# Patient Record
Sex: MALE | Race: WHITE | Employment: OTHER | URBAN - NONMETROPOLITAN AREA
[De-identification: names, ages, dates, MRNs, and addresses within clinical notes are randomized per-mention and may not be internally consistent; named-entity substitution may affect disease eponyms.]

---

## 2017-07-27 ENCOUNTER — APPOINTMENT (OUTPATIENT)
Dept: OCCUPATIONAL MEDICINE | Facility: CLINIC | Age: 57
End: 2017-07-27

## 2017-07-27 PROCEDURE — 99000 SPECIMEN HANDLING OFFICE-LAB: CPT | Performed by: FAMILY MEDICINE

## 2019-01-04 ENCOUNTER — TRANSFERRED RECORDS (OUTPATIENT)
Dept: HEALTH INFORMATION MANAGEMENT | Facility: CLINIC | Age: 59
End: 2019-01-04

## 2019-01-18 ENCOUNTER — TRANSFERRED RECORDS (OUTPATIENT)
Dept: HEALTH INFORMATION MANAGEMENT | Facility: CLINIC | Age: 59
End: 2019-01-18

## 2019-02-01 ENCOUNTER — TRANSFERRED RECORDS (OUTPATIENT)
Dept: HEALTH INFORMATION MANAGEMENT | Facility: CLINIC | Age: 59
End: 2019-02-01

## 2019-05-16 ENCOUNTER — TRANSFERRED RECORDS (OUTPATIENT)
Dept: HEALTH INFORMATION MANAGEMENT | Facility: CLINIC | Age: 59
End: 2019-05-16

## 2019-05-23 ENCOUNTER — TRANSFERRED RECORDS (OUTPATIENT)
Dept: HEALTH INFORMATION MANAGEMENT | Facility: CLINIC | Age: 59
End: 2019-05-23

## 2019-08-27 ENCOUNTER — TRANSFERRED RECORDS (OUTPATIENT)
Dept: HEALTH INFORMATION MANAGEMENT | Facility: CLINIC | Age: 59
End: 2019-08-27

## 2019-08-29 ENCOUNTER — TRANSFERRED RECORDS (OUTPATIENT)
Dept: HEALTH INFORMATION MANAGEMENT | Facility: CLINIC | Age: 59
End: 2019-08-29

## 2019-08-29 ENCOUNTER — TELEPHONE (OUTPATIENT)
Dept: UROLOGY | Facility: CLINIC | Age: 59
End: 2019-08-29

## 2019-08-29 ENCOUNTER — MEDICAL CORRESPONDENCE (OUTPATIENT)
Dept: HEALTH INFORMATION MANAGEMENT | Facility: CLINIC | Age: 59
End: 2019-08-29

## 2019-08-29 NOTE — TELEPHONE ENCOUNTER
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  Appointment scheduled with Dr. Russell on 10/3/2019, referral requesting Dr. Licea.  Per protocol Dr. Licea is not listed as a provider to treat this diagnosis.  Routing encounter to clinic to advise on sooner appointment.

## 2019-08-29 NOTE — TELEPHONE ENCOUNTER
Changed appt to Thursday 9/5 at 6pm and tried calling all numbers  NONE of them went thru to machine Grace Powell LPN Staff Nurse

## 2019-08-29 NOTE — TELEPHONE ENCOUNTER
ONCOLOGY INTAKE: Records Information      APPT INFORMATION:  Referring provider:  Dr. 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 yes    Has patient been seen for any external appt for this diagnosis? yes    If yes, where? [facility]    Has patient had any imaging or procedures outside of [place] for this condition? yes      If Yes, where? [facility]    ADDITIONAL INFORMATION:  Records sent to CSS team

## 2019-08-30 ENCOUNTER — TELEPHONE (OUTPATIENT)
Dept: UROLOGY | Facility: CLINIC | Age: 59
End: 2019-08-30

## 2019-08-30 NOTE — TELEPHONE ENCOUNTER
[facility] Call Center    Phone Message    May a detailed message be left on voicemail: yes    Reason for Call: Other: Jose Armando calling to reschedule 09/05 appt. He states he wont be able to make it. Please call him back to reschedule.      Action Taken: Message routed to:  [facility] ([facility]): azam meek

## 2019-08-30 NOTE — TELEPHONE ENCOUNTER
Appointment rescheduled with Dr. Russell for Oct. 3rd. Pt states he needs to give his work notice, and this date works perfect for him.

## 2019-08-30 NOTE — TELEPHONE ENCOUNTER
Action Elham 8/30 10:58AM    Action Taken Called [facility], spoke with Gilda she will fax 32 pages.

## 2019-09-03 NOTE — TELEPHONE ENCOUNTER
Action Elham 9/3 7AM    Action Taken [facility] recs scanned in epic  Image CT Abd Pelvis 5/23/19 available in  PACS

## 2019-09-10 PROBLEM — R31.0 GROSS HEMATURIA: Status: ACTIVE | Noted: 2019-08-27

## 2019-09-10 PROBLEM — E11.9 DIABETES (H): Status: ACTIVE | Noted: 2019-08-27

## 2019-09-10 PROBLEM — N20.0 KIDNEY STONES: Status: ACTIVE | Noted: 2019-08-27

## 2019-09-10 PROBLEM — D07.4: Status: ACTIVE | Noted: 2019-08-27

## 2019-09-12 ENCOUNTER — PRE VISIT (OUTPATIENT)
Dept: UROLOGY | Facility: CLINIC | Age: 59
End: 2019-09-12

## 2019-09-12 NOTE — TELEPHONE ENCOUNTER
Reason for Visit: Consult    Diagnosis: Carcinoma in situ of skin of penis    Orders/Procedures/Records: Records available    Contact Patient: N/A    Rooming Requirements: Teo Montes De Oca  09/12/19  12:37 PM

## 2019-09-19 ENCOUNTER — DOCUMENTATION ONLY (OUTPATIENT)
Dept: CARE COORDINATION | Facility: CLINIC | Age: 59
End: 2019-09-19

## 2019-10-01 ENCOUNTER — HEALTH MAINTENANCE LETTER (OUTPATIENT)
Age: 59
End: 2019-10-01

## 2019-10-03 ENCOUNTER — OFFICE VISIT (OUTPATIENT)
Dept: UROLOGY | Facility: CLINIC | Age: 59
End: 2019-10-03
Payer: COMMERCIAL

## 2019-10-03 ENCOUNTER — PRE VISIT (OUTPATIENT)
Dept: UROLOGY | Facility: CLINIC | Age: 59
End: 2019-10-03

## 2019-10-03 VITALS
HEART RATE: 91 BPM | WEIGHT: 175 LBS | SYSTOLIC BLOOD PRESSURE: 152 MMHG | BODY MASS INDEX: 23.7 KG/M2 | HEIGHT: 72 IN | DIASTOLIC BLOOD PRESSURE: 89 MMHG

## 2019-10-03 DIAGNOSIS — D07.4: Primary | ICD-10-CM

## 2019-10-03 RX ORDER — PIOGLITAZONEHYDROCHLORIDE 45 MG/1
TABLET ORAL
COMMUNITY
Start: 2019-07-18

## 2019-10-03 RX ORDER — DULAGLUTIDE 0.75 MG/.5ML
INJECTION, SOLUTION SUBCUTANEOUS
COMMUNITY
Start: 2019-07-29

## 2019-10-03 RX ORDER — GLIPIZIDE 5 MG/1
TABLET, FILM COATED, EXTENDED RELEASE ORAL
COMMUNITY
Start: 2019-08-19

## 2019-10-03 RX ORDER — SITAGLIPTIN 100 MG/1
TABLET, FILM COATED ORAL
COMMUNITY
Start: 2019-08-23

## 2019-10-03 RX ORDER — SIMVASTATIN 40 MG
TABLET ORAL
COMMUNITY
Start: 2019-04-11

## 2019-10-03 ASSESSMENT — MIFFLIN-ST. JEOR: SCORE: 1656.79

## 2019-10-03 ASSESSMENT — PAIN SCALES - GENERAL: PAINLEVEL: NO PAIN (0)

## 2019-10-03 NOTE — LETTER
"10/3/2019     RE: Jose Armando Brar  [address] [facility] [address]     Dear Colleague,    Thank you for referring your patient, Jose Armando Brar, to the [facility] UROLOGY AND [facility] FOR PROSTATE AND UROLOGIC CANCERS at [facility]. Please see a copy of my visit note below.      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 There is no evidence of cancer currently.      Plan:  He will monitor the area carefully. If he develops any oozing, redness, pain, or bulge, the area should be re-excised. We discussed that Mohs surgery would be the safest, most conservative option but is likely not necessary.     Follow up: He  follow up every 3-4 months in the first year and then 6 months later after the 1 year maxi.     Attestation:  This patient was seen and evaluated by me, with a scribe taking notes.  I have reviewed the note above and agree.  The physical exam and or any procedures were performed by me and the pertinant details are outlined below.       Jake Russell MD  Department of Urology  [facility]    ---------------------------------------------------------------------------------------------------------------------    Chief Complaint:   Carcinoma in situ of skin of penis    HPI:   Jose Armando Brar  is a 59 year old male with history of CIS with positive margins on two biopsies in 2018 and 2019. Chart review of prior urology notes indicate that \"all specimens showed high grade penile squamous cell carcinoma in situ with no invasion. All pieces had at least one area of positive margin.\"     His prior urologist passed away and he saw Dr. Leigh Ann Mccurdy in follow up on " "08/27/2019, who noted history of gross hematuria with right flank pain in March or April. It resolved and then returned intermittently through July. He had a hand written report from someone at [facility] that said \"+kidney stones\" for the CT report. Cystoscopy showed no source or hematuria.     Review of Systems:   Pertinent items are noted in HPI or as below, remainder of complete ROS is negative.      Active Medications:     Current Outpatient Medications:      glipiZIDE (GLUCOTROL XL) 5 MG 24 hr tablet, , Disp: , Rfl:      JANUVIA 100 MG tablet, , Disp: , Rfl:      pioglitazone (ACTOS) 45 MG tablet, , Disp: , Rfl:      simvastatin (ZOCOR) 40 MG tablet, , Disp: , Rfl:      TRULICITY 0.75 MG/0.5ML pen, , Disp: , Rfl:       Allergies:   Nkda [no known drug allergies]      Past Medical History:  Carcinoma in situ of skin of penis   Diabetes   Gross hematuria   Kidney stones     Past Surgical History:  Open left shoulder dislocation 11/30/2007   SCC excision penis 02/01/2019   Bilateral hernia repair   Knee surgery     Family History:   Prostate cancer in father and paternal uncle.     Social History:   Former smoker, 8 pack years.   Occasional alcohol use.      Physical Exam:   Patient is a 59 year old  male   Vitals: Blood pressure (!) 152/89, pulse 91, height 1.829 m (6'), weight 79.4 kg (175 lb).  General Appearance Adult: Alert, no acute distress, oriented  HENT: throat/mouth:normal, good dentition  Lungs: no respiratory distress, or pursed lip breathing  Heart: No obvious jugular venous distension present  Abdomen: soft, nontender, no organomegaly or masses, Body mass index is 23.73 kg/m .  Lymphatics: No cervical or supraclavicular adenopathy  Musculoskeltal: extremities normal, no peripheral edema  Skin: no visible suspicious lesions or rashes  Neuro: Alert, oriented, speech and mentation normal  Psych: affect and mood normal  Gait: Normal  : Mild scarring noted on the shaft of the penis at the " surgical site. No velvety lesions. No bleeding. Non-palpable inguinal lymph nodes bilaterally.     Imaging:   I have personally reviewed the results of the below imaging studies. The results were discussed with the patient.     CT abdomen pelvis 05/23/2019         Laboratory:   I personally reviewed all applicable laboratory data and went over findings with patient  Significant for:    PSA of 0.81 on 01/18/2019 02/01/2019:         11/15/2018       Scribe Disclosure:  I, Lizett Kraus, am serving as a scribe to document services personally performed by Jake Russell MD at this visit, based upon the provider's statements to me. All documentation has been reviewed by the aforementioned provider prior to being entered into the official medical record.     Again, thank you for allowing me to participate in the care of your patient.      Sincerely,    Jake Russell MD

## 2019-10-03 NOTE — PROGRESS NOTES
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 There is no evidence of cancer currently.      Plan:  He will monitor the area carefully. If he develops any oozing, redness, pain, or bulge, the area should be re-excised. We discussed that Mohs surgery would be the safest, most conservative option but is likely not necessary.     Follow up: He  follow up every 3-4 months in the first year and then 6 months later after the 1 year maxi.     Attestation:  This patient was seen and evaluated by me, with a scribe taking notes.  I have reviewed the note above and agree.  The physical exam and or any procedures were performed by me and the pertinant details are outlined below.       Jake Russell MD  Department of Urology  [facility]    ---------------------------------------------------------------------------------------------------------------------    Chief Complaint:   Carcinoma in situ of skin of penis    HPI:   Jose Armando Brar  is a 59 year old male with history of CIS with positive margins on two biopsies in 2018 and 2019. Chart review of prior urology notes indicate that \"all specimens showed high grade penile squamous cell carcinoma in situ with no invasion. All pieces had at least one area of positive margin.\"     His prior urologist passed away and he saw Dr. Leigh Ann Mccurdy in follow up on 2019, who noted history of gross hematuria with right flank pain in March or April. It resolved and then returned intermittently through July. He had a hand written report from someone at [facility] that said \"+kidney stones\" for the CT report. Cystoscopy showed no source or hematuria.     Review of " Systems:   Pertinent items are noted in HPI or as below, remainder of complete ROS is negative.      Active Medications:     Current Outpatient Medications:      glipiZIDE (GLUCOTROL XL) 5 MG 24 hr tablet, , Disp: , Rfl:      JANUVIA 100 MG tablet, , Disp: , Rfl:      pioglitazone (ACTOS) 45 MG tablet, , Disp: , Rfl:      simvastatin (ZOCOR) 40 MG tablet, , Disp: , Rfl:      TRULICITY 0.75 MG/0.5ML pen, , Disp: , Rfl:       Allergies:   Nkda [no known drug allergies]      Past Medical History:  Carcinoma in situ of skin of penis   Diabetes   Gross hematuria   Kidney stones     Past Surgical History:  Open left shoulder dislocation 11/30/2007   SCC excision penis 02/01/2019   Bilateral hernia repair   Knee surgery     Family History:   Prostate cancer in father and paternal uncle.     Social History:   Former smoker, 8 pack years.   Occasional alcohol use.      Physical Exam:   Patient is a 59 year old  male   Vitals: Blood pressure (!) 152/89, pulse 91, height 1.829 m (6'), weight 79.4 kg (175 lb).  General Appearance Adult: Alert, no acute distress, oriented  HENT: throat/mouth:normal, good dentition  Lungs: no respiratory distress, or pursed lip breathing  Heart: No obvious jugular venous distension present  Abdomen: soft, nontender, no organomegaly or masses, Body mass index is 23.73 kg/m .  Lymphatics: No cervical or supraclavicular adenopathy  Musculoskeltal: extremities normal, no peripheral edema  Skin: no visible suspicious lesions or rashes  Neuro: Alert, oriented, speech and mentation normal  Psych: affect and mood normal  Gait: Normal  : Mild scarring noted on the shaft of the penis at the surgical site. No velvety lesions. No bleeding. Non-palpable inguinal lymph nodes bilaterally.     Imaging:   I have personally reviewed the results of the below imaging studies. The results were discussed with the patient.     CT abdomen pelvis 05/23/2019         Laboratory:   I personally reviewed all applicable  laboratory data and went over findings with patient  Significant for:    PSA of 0.81 on 01/18/2019 02/01/2019:         11/15/2018       Scribe Disclosure:  I, Lizett Kraus, am serving as a scribe to document services personally performed by Jake Russell MD at this visit, based upon the provider's statements to me. All documentation has been reviewed by the aforementioned provider prior to being entered into the official medical record.

## 2019-10-03 NOTE — NURSING NOTE
Chief Complaint   Patient presents with     Consult     Carcinoma in situ of skin of penis       Blood pressure (!) 152/89, pulse 91, height 1.829 m (6'), weight 79.4 kg (175 lb). Body mass index is 23.73 kg/m .    Patient Active Problem List   Diagnosis     Carcinoma in situ of skin of penis     Diabetes (H)     Gross hematuria     Kidney stones       Allergies   Allergen Reactions     Nkda [No Known Drug Allergies]      Noted in 11/30/07 ER       Current Outpatient Medications   Medication Sig Dispense Refill     glipiZIDE (GLUCOTROL XL) 5 MG 24 hr tablet        JANUVIA 100 MG tablet        pioglitazone (ACTOS) 45 MG tablet        simvastatin (ZOCOR) 40 MG tablet        TRULICITY 0.75 MG/0.5ML pen          Social History     Tobacco Use     Smoking status: Former Smoker     Packs/day: 0.00     Smokeless tobacco: Never Used   Substance Use Topics     Alcohol use: None     Drug use: None       Joellen Montes De Oca, EMT  10/3/2019  4:18 PM

## 2019-10-17 ENCOUNTER — RECORDS - HEALTHEAST (OUTPATIENT)
Dept: ADMINISTRATIVE | Facility: OTHER | Age: 59
End: 2019-10-17

## 2020-01-16 ASSESSMENT — MIFFLIN-ST. JEOR: SCORE: 1677.65

## 2020-01-22 ENCOUNTER — RECORDS - HEALTHEAST (OUTPATIENT)
Dept: ADMINISTRATIVE | Facility: OTHER | Age: 60
End: 2020-01-22

## 2020-01-23 ENCOUNTER — HOSPITAL ENCOUNTER (OUTPATIENT)
Dept: INTERVENTIONAL RADIOLOGY/VASCULAR | Facility: CLINIC | Age: 60
Discharge: HOME | End: 2020-01-23
Attending: UROLOGY | Admitting: UROLOGY

## 2020-01-23 ENCOUNTER — ANESTHESIA - HEALTHEAST (OUTPATIENT)
Dept: SURGERY | Facility: CLINIC | Age: 60
End: 2020-01-23

## 2020-01-23 ENCOUNTER — SURGERY - HEALTHEAST (OUTPATIENT)
Dept: SURGERY | Facility: CLINIC | Age: 60
End: 2020-01-23

## 2020-01-23 DIAGNOSIS — N20.0 CALCULUS OF RIGHT KIDNEY: ICD-10-CM

## 2020-01-23 ASSESSMENT — MIFFLIN-ST. JEOR
SCORE: 1642.73
SCORE: 1624.87

## 2020-01-24 ASSESSMENT — MIFFLIN-ST. JEOR: SCORE: 1649.53

## 2020-01-26 ENCOUNTER — COMMUNICATION - HEALTHEAST (OUTPATIENT)
Dept: SCHEDULING | Facility: CLINIC | Age: 60
End: 2020-01-26

## 2020-02-03 ENCOUNTER — COMMUNICATION - HEALTHEAST (OUTPATIENT)
Dept: SCHEDULING | Facility: CLINIC | Age: 60
End: 2020-02-03

## 2020-02-03 ENCOUNTER — RECORDS - HEALTHEAST (OUTPATIENT)
Dept: ADMINISTRATIVE | Facility: OTHER | Age: 60
End: 2020-02-03

## 2020-02-13 ENCOUNTER — APPOINTMENT (OUTPATIENT)
Dept: OCCUPATIONAL MEDICINE | Facility: CLINIC | Age: 60
End: 2020-02-13

## 2020-02-13 PROCEDURE — 82075 ASSAY OF BREATH ETHANOL: CPT | Performed by: FAMILY MEDICINE

## 2020-03-22 ENCOUNTER — HEALTH MAINTENANCE LETTER (OUTPATIENT)
Age: 60
End: 2020-03-22

## 2021-01-07 ENCOUNTER — TRANSFERRED RECORDS (OUTPATIENT)
Dept: HEALTH INFORMATION MANAGEMENT | Facility: CLINIC | Age: 61
End: 2021-01-07

## 2021-01-13 ENCOUNTER — TELEPHONE (OUTPATIENT)
Dept: DERMATOLOGY | Facility: CLINIC | Age: 61
End: 2021-01-13

## 2021-01-13 NOTE — TELEPHONE ENCOUNTER
Message left for Essence, referral coordinator who called to call back or fax us records at fax: [phone] and patient can call or we can call patient to schedule.  Marisabel Cid RN

## 2021-01-13 NOTE — TELEPHONE ENCOUNTER
Essence cannot get through to anyone and would like to schedule a MOHS procedure for this patient who has skin cancer.  Please call.    Thank you.

## 2021-01-14 ENCOUNTER — TRANSCRIBE ORDERS (OUTPATIENT)
Dept: OTHER | Age: 61
End: 2021-01-14

## 2021-01-14 DIAGNOSIS — C44.42 SQUAMOUS CELL CARCINOMA OF SKIN OF SCALP: Primary | ICD-10-CM

## 2021-01-15 ENCOUNTER — HEALTH MAINTENANCE LETTER (OUTPATIENT)
Age: 61
End: 2021-01-15

## 2021-01-27 ENCOUNTER — OFFICE VISIT (OUTPATIENT)
Dept: DERMATOLOGY | Facility: CLINIC | Age: 61
End: 2021-01-27
Payer: COMMERCIAL

## 2021-01-27 VITALS — SYSTOLIC BLOOD PRESSURE: 126 MMHG | HEART RATE: 96 BPM | OXYGEN SATURATION: 100 % | DIASTOLIC BLOOD PRESSURE: 78 MMHG

## 2021-01-27 DIAGNOSIS — D04.4 SQUAMOUS CELL CARCINOMA IN SITU (SCCIS) OF SCALP: Primary | ICD-10-CM

## 2021-01-27 DIAGNOSIS — L82.1 SEBORRHEIC KERATOSIS: ICD-10-CM

## 2021-01-27 DIAGNOSIS — L81.4 LENTIGO: ICD-10-CM

## 2021-01-27 DIAGNOSIS — D18.01 ANGIOMA OF SKIN: ICD-10-CM

## 2021-01-27 DIAGNOSIS — C44.42 SQUAMOUS CELL CARCINOMA OF SKIN OF SCALP: ICD-10-CM

## 2021-01-27 PROCEDURE — 11103 TANGNTL BX SKIN EA SEP/ADDL: CPT | Mod: 59 | Performed by: DERMATOLOGY

## 2021-01-27 PROCEDURE — 99203 OFFICE O/P NEW LOW 30 MIN: CPT | Mod: 25 | Performed by: DERMATOLOGY

## 2021-01-27 PROCEDURE — 13121 CMPLX RPR S/A/L 2.6-7.5 CM: CPT | Performed by: DERMATOLOGY

## 2021-01-27 PROCEDURE — 88331 PATH CONSLTJ SURG 1 BLK 1SPC: CPT | Mod: 59 | Performed by: DERMATOLOGY

## 2021-01-27 PROCEDURE — 17311 MOHS 1 STAGE H/N/HF/G: CPT | Performed by: DERMATOLOGY

## 2021-01-27 PROCEDURE — 11102 TANGNTL BX SKIN SINGLE LES: CPT | Mod: 59 | Performed by: DERMATOLOGY

## 2021-01-27 NOTE — PROGRESS NOTES
Jose Armandomyles Brar , a 60 year old year old male patient, I was asked to see by Dr. Calderon for squamous cell carcinoma on scalp.  Patient states this has been present for yeatrs.  Patient reports the following symptoms:  growing .  Patient reports the following previous treatments cryo .  Patient reports the following modifying factors none.  Associated symptoms: none.  HE also notes other spot son scalp.  .  Patient has no other skin complaints today.  Remainder of the HPI, Meds, PMH, Allergies, FH, and SH was reviewed in chart.      Past Medical History:   Diagnosis Date     Squamous cell carcinoma of skin, unspecified        Past Surgical History:   Procedure Laterality Date     C OPEN RX SHLDR DISLOC,GR TUB FX  11/30/07    LT        History reviewed. No pertinent family history.    Social History     Socioeconomic History     Marital status:      Spouse name: Not on file     Number of children: Not on file     Years of education: Not on file     Highest education level: Not on file   Occupational History     Not on file   Social Needs     Financial resource strain: Not on file     Food insecurity     Worry: Not on file     Inability: Not on file     Transportation needs     Medical: Not on file     Non-medical: Not on file   Tobacco Use     Smoking status: Former Smoker     Packs/day: 0.00     Smokeless tobacco: Never Used   Substance and Sexual Activity     Alcohol use: Not on file     Drug use: Not on file     Sexual activity: Not on file   Lifestyle     Physical activity     Days per week: Not on file     Minutes per session: Not on file     Stress: Not on file   Relationships     Social connections     Talks on phone: Not on file     Gets together: Not on file     Attends [employer] service: Not on file     Active member of club or organization: Not on file     Attends meetings of clubs or organizations: Not on file     Relationship status: Not on file     Intimate partner violence     Fear of current or  ex partner: Not on file     Emotionally abused: Not on file     Physically abused: Not on file     Forced sexual activity: Not on file   Other Topics Concern     Not on file   Social History Narrative     Not on file       Outpatient Encounter Medications as of 1/27/2021   Medication Sig Dispense Refill     glipiZIDE (GLUCOTROL XL) 5 MG 24 hr tablet        JANUVIA 100 MG tablet        pioglitazone (ACTOS) 45 MG tablet        simvastatin (ZOCOR) 40 MG tablet        TRULICITY 0.75 MG/0.5ML pen        No facility-administered encounter medications on file as of 1/27/2021.              Review Of Systems  Skin: As above  Eyes: negative  Ears/Nose/Throat: negative  Respiratory: No shortness of breath, dyspnea on exertion, cough, or hemoptysis  Cardiovascular: negative  Gastrointestinal: negative  Genitourinary: negative  Musculoskeletal: negative  Neurologic: negative  Psychiatric: negative  Hematologic/Lymphatic/Immunologic: negative  Endocrine: negative      O:   NAD, WDWN, Alert & Oriented, Mood & Affect wnl, Vitals stable   Here today alone   /78   Pulse 96   SpO2 100%    General appearance poly ii   Vitals stable   Alert, oriented and in no acute distress      Following lymph nodes palpated: Occipital, Cervical, Supraclavicular no lad   R vertex scalp 1.9cm keratotic nodule  Mid ant veryex tender 1.6cm red scaly plaque  L anterior vertex scalp 1.8cm red scaly plaque      Stuck on papules and brown macules on trunk and ext    Red papules on neck      The remainder of expanded problem focused exam was normal; the following areas were examined:  scalp/hair, conjunctiva/lids, face, neck, , chest, digits/nails, RUE, LUE.      Eyes: Conjunctivae/lids:Normal     ENT: Lips, buccal mucosa, tongue: normal    MSK:Normal    Cardiovascular: peripheral edema none    Pulm: Breathing Normal    Lymph Nodes: No Head and Neck Lymphadenopathy     Neuro/Psych: Orientation:Normal; Mood/Affect:Normal      MICRO:     Mid ant vertex  scalp (red):There is hyperkeratosis & parakeratosis of the epidermis, with full thickness epidermal involvement by atypical keratinocytes with rare pale vacuolated cells.  Unremarkable dermis.    L ant vertex scalp:There is hyperkeratosis & parakeratosis of the epidermis, with full thickness epidermal involvement by atypical keratinocytes with rare pale vacuolated cells invading dermis.    A/P:  1. Seborrheic keratosis, lentigo, angioma,   2. Squamous cell carcinoma scalp   3. R/o squamous cell carcinoma   TANGENTIAL BIOPSY IN HOUSE:  After consent, anesthesia with LEC and prep, tangential excision performed and dx above confirmed with frozen section histology.  No complications and routine wound care.  Patient is not on  anticoagulants and risk of bleeding discussed with patient.       I have personally reviewed all specimens and/or slides and used them with my medical judgement to determine or confirm the final diagnosis.     Patient told result   Mid ant vertex scalp squamous cell carcinoma in situ  L mid ant vertex scalp squamous cell carcinoma   Schedule excision       It was a pleasure speaking to Jose Armando Brar today.  Previous clinic  notes and pertinent laboratory tests were reviewed prior to Jose Armando Brar's visit.  The following medical tests were ordered and interpreted (bx and frozen section) to assist in the evaluation and management of Jose Armando Brar's issue.    BENIGN LESIONS DISCUSSED WITH PATIENT:  I discussed the specifics of tumor, prognosis, and genetics of benign lesions.  I explained that treatment of these lesions would be purely cosmetic and not medically neccessary.  I discussed with patient different removal options including excision, cautery and /or laser.      Nature and genetics of benign skin lesions dicussed with patient.  Signs and Symptoms of skin cancer discussed with patient.  Patient encouraged to perform monthly skin exams.  UV precautions reviewed with patient.  Risks of  non-melanoma skin cancer discussed with patient   Return to clinic next appt    PROCEDURE NOTE  R vertex scalp squamous cell carcinoma   MOHS:   Location    The rationale for Mohs surgery was discussed with the patient and consent was obtained.  The risks and benefits as well as alternatives to therapy were discussed, in detail.  Specifically, the risks of infection, scarring, bleeding, prolonged wound healing, incomplete removal, allergy to anesthesia, nerve injury and recurrence were addressed.  Indication for Mohs was Location. Prior to the procedure, the treatment site was clearly identified and, if available, confirmed with previous photos and confirmed by the patient   All components of the Universal Protocol/PAUSE rule were completed.  The Mohs surgeon operated in two distinct and integrated capacities as the surgeon and pathologist.      The area was prepped with Betasept.  A rim of normal appearing skin was marked circumferentially around the lesion.  The area was infiltrated with local anesthesia.  The tumor was first debulked to remove all clinically apparent tumor.  An incision following the standard Mohs approach was done and the specimen was oriented,mapped and placed in 2 block(s).  Each specimen was then chromacoded and processed in the Mohs laboratory using standard Mohs technique and submitted for frozen section histology.  Frozen section analysis showed  residual tumor but CLEAR MARGINS.      The tumor was excised using standard Mohs technique in 1 stages(s).  CLEAR MARGINS OBTAINED and Final defect size was 2.7 x 2.5 cm.     We discussed the options for wound management in full with the patient including risks/benefits/ possible outcomes.        REPAIR COMPLEX: Because of the tightness of the surrounding skin and Because of the size and full thickness nature of the defect, a complex closure was planned. After LEC anesthesia and prep, Burow's triangles were excised in the relaxed skin tension  lines. The wound edges were widely undermined by dissection in the subcutaneous plane until adequate tissue mobility was obtained. Hemostasis was obtained. The wound edges were closed in a layered fashion using Vicryl and Fast Absorbing Plain Gut sutures. Postoperative length was 5 cm.   EBL minimal; complications none; wound care routine.  The patient was discharged in good condition and will return in one week for wound evaluation.

## 2021-01-27 NOTE — LETTER
1/27/2021         RE: Jose Armando Brar  [address] [facility] [address]        Dear Colleague,    Thank you for referring your patient, Jose Armando Brar, to the [facility]. Please see a copy of my visit note below.    Surgical Office Location :   [facility]  [facility] [place] [address]      Jose Armando Brar , a 60 year old year old male patient, I was asked to see by Dr. Calderon for squamous cell carcinoma on scalp.  Patient states this has been present for yeatrs.  Patient reports the following symptoms:  growing .  Patient reports the following previous treatments cryo .  Patient reports the following modifying factors none.  Associated symptoms: none.  HE also notes other spot son scalp.  .  Patient has no other skin complaints today.  Remainder of the HPI, Meds, PMH, Allergies, FH, and SH was reviewed in chart.      Past Medical History:   Diagnosis Date     Squamous cell carcinoma of skin, unspecified        Past Surgical History:   Procedure Laterality Date     C OPEN RX SHLDR DISLOC,GR TUB FX  11/30/07    LT        History reviewed. No pertinent family history.    Social History     Socioeconomic History     Marital status:      Spouse name: Not on file     Number of children: Not on file     Years of education: Not on file     Highest education level: Not on file   Occupational History     Not on file   Social Needs     Financial resource strain: Not on file     Food insecurity     Worry: Not on file     Inability: Not on file     Transportation needs     Medical: Not on file     Non-medical: Not on file   Tobacco Use     Smoking status: Former Smoker     Packs/day: 0.00     Smokeless tobacco: Never Used   Substance and Sexual Activity     Alcohol use: Not on file     Drug use: Not on file     Sexual activity: Not on file   Lifestyle     Physical activity     Days per week: Not on file     Minutes per session: Not on file     Stress: Not on  file   Relationships     Social connections     Talks on phone: Not on file     Gets together: Not on file     Attends [employer] service: Not on file     Active member of club or organization: Not on file     Attends meetings of clubs or organizations: Not on file     Relationship status: Not on file     Intimate partner violence     Fear of current or ex partner: Not on file     Emotionally abused: Not on file     Physically abused: Not on file     Forced sexual activity: Not on file   Other Topics Concern     Not on file   Social History Narrative     Not on file       Outpatient Encounter Medications as of 1/27/2021   Medication Sig Dispense Refill     glipiZIDE (GLUCOTROL XL) 5 MG 24 hr tablet        JANUVIA 100 MG tablet        pioglitazone (ACTOS) 45 MG tablet        simvastatin (ZOCOR) 40 MG tablet        TRULICITY 0.75 MG/0.5ML pen        No facility-administered encounter medications on file as of 1/27/2021.              Review Of Systems  Skin: As above  Eyes: negative  Ears/Nose/Throat: negative  Respiratory: No shortness of breath, dyspnea on exertion, cough, or hemoptysis  Cardiovascular: negative  Gastrointestinal: negative  Genitourinary: negative  Musculoskeletal: negative  Neurologic: negative  Psychiatric: negative  Hematologic/Lymphatic/Immunologic: negative  Endocrine: negative      O:   NAD, WDWN, Alert & Oriented, Mood & Affect wnl, Vitals stable   Here today alone   /78   Pulse 96   SpO2 100%    General appearance poly ii   Vitals stable   Alert, oriented and in no acute distress      Following lymph nodes palpated: Occipital, Cervical, Supraclavicular no lad   R vertex scalp 1.9cm keratotic nodule  Mid ant veryex tender 1.6cm red scaly plaque  L anterior vertex scalp 1.8cm red scaly plaque      Stuck on papules and brown macules on trunk and ext    Red papules on neck      The remainder of expanded problem focused exam was normal; the following areas were examined:  scalp/hair,  conjunctiva/lids, face, neck, , chest, digits/nails, RUE, LUE.      Eyes: Conjunctivae/lids:Normal     ENT: Lips, buccal mucosa, tongue: normal    MSK:Normal    Cardiovascular: peripheral edema none    Pulm: Breathing Normal    Lymph Nodes: No Head and Neck Lymphadenopathy     Neuro/Psych: Orientation:Normal; Mood/Affect:Normal      MICRO:     Mid ant vertex scalp (red):There is hyperkeratosis & parakeratosis of the epidermis, with full thickness epidermal involvement by atypical keratinocytes with rare pale vacuolated cells.  Unremarkable dermis.    L ant vertex scalp:There is hyperkeratosis & parakeratosis of the epidermis, with full thickness epidermal involvement by atypical keratinocytes with rare pale vacuolated cells invading dermis.    A/P:  1. Seborrheic keratosis, lentigo, angioma,   2. Squamous cell carcinoma scalp   3. R/o squamous cell carcinoma   TANGENTIAL BIOPSY IN HOUSE:  After consent, anesthesia with LEC and prep, tangential excision performed and dx above confirmed with frozen section histology.  No complications and routine wound care.  Patient is not on  anticoagulants and risk of bleeding discussed with patient.       I have personally reviewed all specimens and/or slides and used them with my medical judgement to determine or confirm the final diagnosis.     Patient told result   Mid ant vertex scalp squamous cell carcinoma in situ  L mid ant vertex scalp squamous cell carcinoma   Schedule excision       It was a pleasure speaking to Jose Armando Brar today.  Previous clinic  notes and pertinent laboratory tests were reviewed prior to Jose Armando Brar's visit.  The following medical tests were ordered and interpreted (bx and frozen section) to assist in the evaluation and management of Jose Armando Brar's issue.    BENIGN LESIONS DISCUSSED WITH PATIENT:  I discussed the specifics of tumor, prognosis, and genetics of benign lesions.  I explained that treatment of these lesions would be purely  cosmetic and not medically neccessary.  I discussed with patient different removal options including excision, cautery and /or laser.      Nature and genetics of benign skin lesions dicussed with patient.  Signs and Symptoms of skin cancer discussed with patient.  Patient encouraged to perform monthly skin exams.  UV precautions reviewed with patient.  Risks of non-melanoma skin cancer discussed with patient   Return to clinic next appt    PROCEDURE NOTE  R vertex scalp squamous cell carcinoma   MOHS:   Location    The rationale for Mohs surgery was discussed with the patient and consent was obtained.  The risks and benefits as well as alternatives to therapy were discussed, in detail.  Specifically, the risks of infection, scarring, bleeding, prolonged wound healing, incomplete removal, allergy to anesthesia, nerve injury and recurrence were addressed.  Indication for Mohs was Location. Prior to the procedure, the treatment site was clearly identified and, if available, confirmed with previous photos and confirmed by the patient   All components of the Universal Protocol/PAUSE rule were completed.  The Mohs surgeon operated in two distinct and integrated capacities as the surgeon and pathologist.      The area was prepped with Betasept.  A rim of normal appearing skin was marked circumferentially around the lesion.  The area was infiltrated with local anesthesia.  The tumor was first debulked to remove all clinically apparent tumor.  An incision following the standard Mohs approach was done and the specimen was oriented,mapped and placed in 2 block(s).  Each specimen was then chromacoded and processed in the Mohs laboratory using standard Mohs technique and submitted for frozen section histology.  Frozen section analysis showed  residual tumor but CLEAR MARGINS.      The tumor was excised using standard Mohs technique in 1 stages(s).  CLEAR MARGINS OBTAINED and Final defect size was 2.7 x 2.5 cm.     We discussed  the options for wound management in full with the patient including risks/benefits/ possible outcomes.        REPAIR COMPLEX: Because of the tightness of the surrounding skin and Because of the size and full thickness nature of the defect, a complex closure was planned. After LEC anesthesia and prep, Burow's triangles were excised in the relaxed skin tension lines. The wound edges were widely undermined by dissection in the subcutaneous plane until adequate tissue mobility was obtained. Hemostasis was obtained. The wound edges were closed in a layered fashion using Vicryl and Fast Absorbing Plain Gut sutures. Postoperative length was 5 cm.   EBL minimal; complications none; wound care routine.  The patient was discharged in good condition and will return in one week for wound evaluation.        Again, thank you for allowing me to participate in the care of your patient.        Sincerely,        Dakota Burnett MD

## 2021-02-02 ENCOUNTER — OFFICE VISIT (OUTPATIENT)
Dept: DERMATOLOGY | Facility: CLINIC | Age: 61
End: 2021-02-02
Payer: COMMERCIAL

## 2021-02-02 DIAGNOSIS — Z48.01 ENCOUNTER FOR CHANGE OR REMOVAL OF SURGICAL WOUND DRESSING: Primary | ICD-10-CM

## 2021-02-02 PROCEDURE — 99207 PR NO CHARGE NURSE ONLY: CPT

## 2021-02-02 NOTE — PROGRESS NOTES
Pt returned to clinic for post surgery 1 week follow up staple removal. Pt has no complaints, denies pain. #9 staples removed from scalp, area cleansed with normal saline. Site is healing and wound edges approximating well. Applied Aquaphor applied.    Advised to watch for signs/sx of infection; spreading redness, drainage, odor, fever. Call or report promptly to clinic. Pt given written instructions and informed to rtc as needed. Patient verbalized understanding.

## 2021-02-16 ENCOUNTER — OFFICE VISIT (OUTPATIENT)
Dept: DERMATOLOGY | Facility: CLINIC | Age: 61
End: 2021-02-16
Payer: COMMERCIAL

## 2021-02-16 VITALS — DIASTOLIC BLOOD PRESSURE: 77 MMHG | HEART RATE: 88 BPM | OXYGEN SATURATION: 98 % | SYSTOLIC BLOOD PRESSURE: 119 MMHG

## 2021-02-16 DIAGNOSIS — C44.42 SQUAMOUS CELL CARCINOMA OF SCALP: Primary | ICD-10-CM

## 2021-02-16 PROCEDURE — 13121 CMPLX RPR S/A/L 2.6-7.5 CM: CPT | Performed by: DERMATOLOGY

## 2021-02-16 PROCEDURE — 17311 MOHS 1 STAGE H/N/HF/G: CPT | Performed by: DERMATOLOGY

## 2021-02-16 PROCEDURE — 99207 PR NO CHARGE LOS: CPT | Performed by: DERMATOLOGY

## 2021-02-16 NOTE — PATIENT INSTRUCTIONS
Stapled wound care SCALP      ? No strenuous activity for 48 hours    ? Take Tylenol as needed for discomfort.                                                .         ? Do not drink alcoholic beverages for 48 hours.    ? Keep the pressure bandage in place for 24 hours. If the bandage becomes blood tinged or loose, reinforce it with gauze and tape.        (Refer to the reverse side of this page for management of bleeding).    ? Remove bandage in 24 hours and begin wound care as follows:     1. Clean area with tap water using a Q tip or gauze pad, (shower / bathe normally)  2. Dry wound with Q tip or gauze pad  3. Apply Aquaphor, Vaseline, Polysporin or Bacitracin Ointment with a Q tip    Do NOT use Neosporin Ointment *  4. Cover the wound with a band-aid or nonstick gauze pad and paper tape.  5. Repeat wound care once a day until wound is completely healed.    It is an old wives tale that a wound heals better when it is exposed to air and allowed to dry out. The wound will heal faster with a better cosmetic result if it is kept moist with ointment and covered with a bandage.  Do not let the wound dry out.      Supplies Needed:                Qtips or gauze pads                Polysporin or Bacitracin Ointment                Bandaids or nonstick gauze pads and paper tape    Wound care kits and brown paper tape are available for purchase at   the pharmacy.    BLEEDIN. Use tightly rolled up gauze or cloth to apply direct pressure over the bandage for 20   minutes.  2. Reapply pressure for an additional 20 minutes if necessary  3. Call the office or go to the nearest emergency room if pressure fails to stop the bleeding.  4. Use additional gauze and tape to maintain pressure once the bleeding has stopped.  5. Begin wound care 24 hours after surgery as directed.                     In case of emergency call: Dr Burnett: [phone]     [facility]: [phone]    [facility]: [phone]

## 2021-02-16 NOTE — PROGRESS NOTES
Jose Armando Brar is an extremely pleasant 60 year old year old male patient here today for evaluation and managment of squamous cell carcinoma on scalp.  Previous site healing well.  Patient has no other skin complaints today.  Remainder of the HPI, Meds, PMH, Allergies, FH, and SH was reviewed in chart.      Past Medical History:   Diagnosis Date     Squamous cell carcinoma of skin, unspecified        Past Surgical History:   Procedure Laterality Date     C OPEN RX SHLDR DISLOC,GR TUB FX  11/30/07    LT        History reviewed. No pertinent family history.    Social History     Socioeconomic History     Marital status:      Spouse name: Not on file     Number of children: Not on file     Years of education: Not on file     Highest education level: Not on file   Occupational History     Not on file   Social Needs     Financial resource strain: Not on file     Food insecurity     Worry: Not on file     Inability: Not on file     Transportation needs     Medical: Not on file     Non-medical: Not on file   Tobacco Use     Smoking status: Former Smoker     Packs/day: 0.00     Smokeless tobacco: Never Used   Substance and Sexual Activity     Alcohol use: Not on file     Drug use: Not on file     Sexual activity: Not on file   Lifestyle     Physical activity     Days per week: Not on file     Minutes per session: Not on file     Stress: Not on file   Relationships     Social connections     Talks on phone: Not on file     Gets together: Not on file     Attends [employer] service: Not on file     Active member of club or organization: Not on file     Attends meetings of clubs or organizations: Not on file     Relationship status: Not on file     Intimate partner violence     Fear of current or ex partner: Not on file     Emotionally abused: Not on file     Physically abused: Not on file     Forced sexual activity: Not on file   Other Topics Concern     Not on file   Social History Narrative     Not on file        Outpatient Encounter Medications as of 2/16/2021   Medication Sig Dispense Refill     glipiZIDE (GLUCOTROL XL) 5 MG 24 hr tablet        JANUVIA 100 MG tablet        pioglitazone (ACTOS) 45 MG tablet        simvastatin (ZOCOR) 40 MG tablet        TRULICITY 0.75 MG/0.5ML pen        No facility-administered encounter medications on file as of 2/16/2021.              Review Of Systems  Skin: As above  Eyes: negative  Ears/Nose/Throat: negative  Respiratory: No shortness of breath, dyspnea on exertion, cough, or hemoptysis  Cardiovascular: negative  Gastrointestinal: negative  Genitourinary: negative  Musculoskeletal: negative  Neurologic: negative  Psychiatric: negative  Hematologic/Lymphatic/Immunologic: negative  Endocrine: negative      O:   NAD, WDWN, Alert & Oriented, Mood & Affect wnl, Vitals stable   Here today alone   /77   Pulse 88   SpO2 98%    General appearance normal   Vitals stable   Alert, oriented and in no acute distress      Following lymph nodes palpated: Occipital, Cervical, Supraclavicular no lad     L mid ant vertex 1.8cm red plaque      Eyes: Conjunctivae/lids:Normal     ENT: Lips, buccal mucosa, tongue: normal    MSK:Normal    Cardiovascular: peripheral edema none    Pulm: Breathing Normal    Lymph Nodes: No Head and Neck Lymphadenopathy     Neuro/Psych: Orientation:Alert and Orientedx3 ; Mood/Affect:normal       A/P:  1. Scalp squamous cell carcinoma   MOHS:   Location    The rationale for Mohs surgery was discussed with the patient and consent was obtained.  The risks and benefits as well as alternatives to therapy were discussed, in detail.  Specifically, the risks of infection, scarring, bleeding, prolonged wound healing, incomplete removal, allergy to anesthesia, nerve injury and recurrence were addressed.  Indication for Mohs was Location. Prior to the procedure, the treatment site was clearly identified and, if available, confirmed with previous photos and confirmed by the  patient   All components of the Universal Protocol/PAUSE rule were completed.  The Mohs surgeon operated in two distinct and integrated capacities as the surgeon and pathologist.      The area was prepped with Betasept.  A rim of normal appearing skin was marked circumferentially around the lesion.  The area was infiltrated with local anesthesia.  The tumor was first debulked to remove all clinically apparent tumor.  An incision following the standard Mohs approach was done and the specimen was oriented,mapped and placed in 1 block(s).  Each specimen was then chromacoded and processed in the Mohs laboratory using standard Mohs technique and submitted for frozen section histology.  Frozen section analysis showed no residual tumor but CLEAR MARGINS.      The tumor was excised using standard Mohs technique in 1 stages(s).  CLEAR MARGINS OBTAINED and Final defect size was 2.6 x 2.8 cm.     We discussed the options for wound management in full with the patient including risks/benefits/ possible outcomes.        REPAIR COMPLEX: Because of the tightness of the surrounding skin and Because of the size and full thickness nature of the defect, a complex closure was planned. After LEC anesthesia and prep, Burow's triangles were excised in the relaxed skin tension lines. The wound edges were widely undermined by dissection in the subcutaneous plane until adequate tissue mobility was obtained. Hemostasis was obtained. The wound edges were closed in a layered fashion using Vicryl and Fast Absorbing Plain Gut sutures. Postoperative length was 4.9 cm.   EBL minimal; complications none; wound care routine.  The patient was discharged in good condition and will return in one week for wound evaluation.  It was a pleasure speaking to Jose Armando Brar today.  Signs and Symptoms of skin cancer discussed with patient.  Patient encouraged to perform monthly skin exams.  UV precautions reviewed with patient.  Risks of non-melanoma skin  cancer discussed with patient   Return to clinic next apt

## 2021-02-16 NOTE — LETTER
2/16/2021         RE: Jose Armando Brar  [address]  [facility] [address]        Dear Colleague,    Thank you for referring your patient, Jose Armando Brar, to the [facility]. Please see a copy of my visit note below.    Surgical Office Location :   [facility]  [facility] [place] [address]      Jose Armando Brar is an extremely pleasant 60 year old year old male patient here today for evaluation and managment of squamous cell carcinoma on scalp.  Previous site healing well.  Patient has no other skin complaints today.  Remainder of the HPI, Meds, PMH, Allergies, FH, and SH was reviewed in chart.      Past Medical History:   Diagnosis Date     Squamous cell carcinoma of skin, unspecified        Past Surgical History:   Procedure Laterality Date     C OPEN RX SHLDR DISLOC,GR TUB FX  11/30/07    LT        History reviewed. No pertinent family history.    Social History     Socioeconomic History     Marital status:      Spouse name: Not on file     Number of children: Not on file     Years of education: Not on file     Highest education level: Not on file   Occupational History     Not on file   Social Needs     Financial resource strain: Not on file     Food insecurity     Worry: Not on file     Inability: Not on file     Transportation needs     Medical: Not on file     Non-medical: Not on file   Tobacco Use     Smoking status: Former Smoker     Packs/day: 0.00     Smokeless tobacco: Never Used   Substance and Sexual Activity     Alcohol use: Not on file     Drug use: Not on file     Sexual activity: Not on file   Lifestyle     Physical activity     Days per week: Not on file     Minutes per session: Not on file     Stress: Not on file   Relationships     Social connections     Talks on phone: Not on file     Gets together: Not on file     Attends Anab[employer]ism service: Not on file     Active member of club or organization: Not on file     Attends meetings of  clubs or organizations: Not on file     Relationship status: Not on file     Intimate partner violence     Fear of current or ex partner: Not on file     Emotionally abused: Not on file     Physically abused: Not on file     Forced sexual activity: Not on file   Other Topics Concern     Not on file   Social History Narrative     Not on file       Outpatient Encounter Medications as of 2/16/2021   Medication Sig Dispense Refill     glipiZIDE (GLUCOTROL XL) 5 MG 24 hr tablet        JANUVIA 100 MG tablet        pioglitazone (ACTOS) 45 MG tablet        simvastatin (ZOCOR) 40 MG tablet        TRULICITY 0.75 MG/0.5ML pen        No facility-administered encounter medications on file as of 2/16/2021.              Review Of Systems  Skin: As above  Eyes: negative  Ears/Nose/Throat: negative  Respiratory: No shortness of breath, dyspnea on exertion, cough, or hemoptysis  Cardiovascular: negative  Gastrointestinal: negative  Genitourinary: negative  Musculoskeletal: negative  Neurologic: negative  Psychiatric: negative  Hematologic/Lymphatic/Immunologic: negative  Endocrine: negative      O:   NAD, WDWN, Alert & Oriented, Mood & Affect wnl, Vitals stable   Here today alone   /77   Pulse 88   SpO2 98%    General appearance normal   Vitals stable   Alert, oriented and in no acute distress      Following lymph nodes palpated: Occipital, Cervical, Supraclavicular no lad     L mid ant vertex 1.8cm red plaque      Eyes: Conjunctivae/lids:Normal     ENT: Lips, buccal mucosa, tongue: normal    MSK:Normal    Cardiovascular: peripheral edema none    Pulm: Breathing Normal    Lymph Nodes: No Head and Neck Lymphadenopathy     Neuro/Psych: Orientation:Alert and Orientedx3 ; Mood/Affect:normal       A/P:  1. Scalp squamous cell carcinoma   MOHS:   Location    The rationale for Mohs surgery was discussed with the patient and consent was obtained.  The risks and benefits as well as alternatives to therapy were discussed, in detail.   Specifically, the risks of infection, scarring, bleeding, prolonged wound healing, incomplete removal, allergy to anesthesia, nerve injury and recurrence were addressed.  Indication for Mohs was Location. Prior to the procedure, the treatment site was clearly identified and, if available, confirmed with previous photos and confirmed by the patient   All components of the Universal Protocol/PAUSE rule were completed.  The Mohs surgeon operated in two distinct and integrated capacities as the surgeon and pathologist.      The area was prepped with Betasept.  A rim of normal appearing skin was marked circumferentially around the lesion.  The area was infiltrated with local anesthesia.  The tumor was first debulked to remove all clinically apparent tumor.  An incision following the standard Mohs approach was done and the specimen was oriented,mapped and placed in 1 block(s).  Each specimen was then chromacoded and processed in the Mohs laboratory using standard Mohs technique and submitted for frozen section histology.  Frozen section analysis showed no residual tumor but CLEAR MARGINS.      The tumor was excised using standard Mohs technique in 1 stages(s).  CLEAR MARGINS OBTAINED and Final defect size was 2.6 x 2.8 cm.     We discussed the options for wound management in full with the patient including risks/benefits/ possible outcomes.        REPAIR COMPLEX: Because of the tightness of the surrounding skin and Because of the size and full thickness nature of the defect, a complex closure was planned. After LEC anesthesia and prep, Burow's triangles were excised in the relaxed skin tension lines. The wound edges were widely undermined by dissection in the subcutaneous plane until adequate tissue mobility was obtained. Hemostasis was obtained. The wound edges were closed in a layered fashion using Vicryl and Fast Absorbing Plain Gut sutures. Postoperative length was 4.9 cm.   EBL minimal; complications none; wound care  routine.  The patient was discharged in good condition and will return in one week for wound evaluation.  It was a pleasure speaking to Jose Armando Brar today.  Signs and Symptoms of skin cancer discussed with patient.  Patient encouraged to perform monthly skin exams.  UV precautions reviewed with patient.  Risks of non-melanoma skin cancer discussed with patient   Return to clinic next apt        Again, thank you for allowing me to participate in the care of your patient.        Sincerely,        Dakota Burnett MD

## 2021-02-23 ENCOUNTER — OFFICE VISIT (OUTPATIENT)
Dept: DERMATOLOGY | Facility: CLINIC | Age: 61
End: 2021-02-23
Payer: COMMERCIAL

## 2021-02-23 DIAGNOSIS — Z48.01 ENCOUNTER FOR CHANGE OR REMOVAL OF SURGICAL WOUND DRESSING: Primary | ICD-10-CM

## 2021-02-23 PROCEDURE — 99207 PR NO CHARGE NURSE ONLY: CPT

## 2021-02-23 NOTE — PROGRESS NOTES
Pt returned to clinic for post surgery 1 week follow up staple removal. Pt has no complaints, denies pain. #10 staples removed from scalp, area cleansed with normal saline. Site is healing and wound edges approximating well.     Advised to watch for signs/sx of infection; spreading redness, drainage, odor, fever. Call or report promptly to clinic. Pt given written instructions and informed to rtc as needed. Patient verbalized understanding.

## 2021-03-15 NOTE — PROGRESS NOTES
Surgical Office Location :   [facility]  [address] [place] [address]

## 2021-03-16 ENCOUNTER — OFFICE VISIT (OUTPATIENT)
Dept: DERMATOLOGY | Facility: CLINIC | Age: 61
End: 2021-03-16
Payer: COMMERCIAL

## 2021-03-16 VITALS — SYSTOLIC BLOOD PRESSURE: 130 MMHG | DIASTOLIC BLOOD PRESSURE: 79 MMHG | HEART RATE: 98 BPM | OXYGEN SATURATION: 97 %

## 2021-03-16 DIAGNOSIS — Z85.828 HISTORY OF SKIN CANCER: Primary | ICD-10-CM

## 2021-03-16 DIAGNOSIS — L82.1 SEBORRHEIC KERATOSIS: ICD-10-CM

## 2021-03-16 DIAGNOSIS — L81.4 LENTIGO: ICD-10-CM

## 2021-03-16 DIAGNOSIS — D04.4 SQUAMOUS CELL CARCINOMA IN SITU (SCCIS) OF SCALP: ICD-10-CM

## 2021-03-16 PROCEDURE — 99213 OFFICE O/P EST LOW 20 MIN: CPT | Mod: 25 | Performed by: DERMATOLOGY

## 2021-03-16 PROCEDURE — 17311 MOHS 1 STAGE H/N/HF/G: CPT | Performed by: DERMATOLOGY

## 2021-03-16 NOTE — PROGRESS NOTES
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  Previous sites healing well.  HE notes spots on his back today.   .   Patient states this has been present for a while.  Patient reports the following symptoms:  none.  Patient reports the following previous treatments none.  These treatments did not work.  Patient reports the following modifying factors none.  Associated symptoms: none.  Patient has no other skin complaints today.  Remainder of the HPI, Meds, PMH, Allergies, FH, and SH was reviewed in chart.      Past Medical History:   Diagnosis Date     Squamous cell carcinoma of skin, unspecified        Past Surgical History:   Procedure Laterality Date     C OPEN RX SHLDR DISLOC,GR TUB FX  11/30/07    LT        History reviewed. No pertinent family history.    Social History     Socioeconomic History     Marital status:      Spouse name: Not on file     Number of children: Not on file     Years of education: Not on file     Highest education level: Not on file   Occupational History     Not on file   Social Needs     Financial resource strain: Not on file     Food insecurity     Worry: Not on file     Inability: Not on file     Transportation needs     Medical: Not on file     Non-medical: Not on file   Tobacco Use     Smoking status: Former Smoker     Packs/day: 0.00     Smokeless tobacco: Never Used   Substance and Sexual Activity     Alcohol use: Not on file     Drug use: Not on file     Sexual activity: Not on file   Lifestyle     Physical activity     Days per week: Not on file     Minutes per session: Not on file     Stress: Not on file   Relationships     Social connections     Talks on phone: Not on file     Gets together: Not on file     Attends Syn[employer] service: Not on file     Active member of club or organization: Not on file     Attends meetings of clubs or  organizations: Not on file     Relationship status: Not on file     Intimate partner violence     Fear of current or ex partner: Not on file     Emotionally abused: Not on file     Physically abused: Not on file     Forced sexual activity: Not on file   Other Topics Concern     Not on file   Social History Narrative     Not on file       Outpatient Encounter Medications as of 3/16/2021   Medication Sig Dispense Refill     glipiZIDE (GLUCOTROL XL) 5 MG 24 hr tablet        JANUVIA 100 MG tablet        pioglitazone (ACTOS) 45 MG tablet        simvastatin (ZOCOR) 40 MG tablet        TRULICITY 0.75 MG/0.5ML pen        No facility-administered encounter medications on file as of 3/16/2021.              Review Of Systems  Skin: As above  Eyes: negative  Ears/Nose/Throat: negative  Respiratory: No shortness of breath, dyspnea on exertion, cough, or hemoptysis  Cardiovascular: negative  Gastrointestinal: negative  Genitourinary: negative  Musculoskeletal: negative  Neurologic: negative  Psychiatric: negative  Hematologic/Lymphatic/Immunologic: negative  Endocrine: negative      O:   NAD, WDWN, Alert & Oriented, Mood & Affect wnl, Vitals stable   Here today alone   /79   Pulse 98   SpO2 97%    General appearance normal   Vitals stable   Alert, oriented and in no acute distress      Following lymph nodes palpated: Occipital, Cervical, Supraclavicular no lad   Mid ant vertex scalp ill-defined 1.6cm red scaly plaque      Stuck on papules and brown macules on trunk and ext      The remainder of expanded problem focused exam was normal; the following areas were examined:  scalp/hair, conjunctiva/lids, face, neck, back, chest, digits/nails, RUE, LUE.      Eyes: Conjunctivae/lids:Normal     ENT: Lips, buccal mucosa, tongue: normal    MSK:Normal    Cardiovascular: peripheral edema none    Pulm: Breathing Normal    Lymph Nodes: No Head and Neck Lymphadenopathy     Neuro/Psych: Orientation:Alert and Orientedx3 ;  Mood/Affect:normal       A/P:  1. Seborrheic keratosis, lentigo,   2. Hx of non-melanoma skin cancer   3. Squamous cell carcinoma in situ scalp    It was a pleasure speaking to Jose Armando Brar today.    Nature of benign skin lesions dicussed with patient.  Signs and Symptoms of skin cancer discussed with patient.  Patient encouraged to perform monthly skin exams.  UV precautions reviewed with patient.  Risks of non-melanoma skin cancer discussed with patient   Return to clinic 6 months    PROCEDURE NOTE  Squamous cell carcinoma in situ scalp   MOHS:   Size and Location    The rationale for Mohs surgery was discussed with the patient and consent was obtained.  The risks and benefits as well as alternatives to therapy were discussed, in detail.  Specifically, the risks of infection, scarring, bleeding, prolonged wound healing, incomplete removal, allergy to anesthesia, nerve injury and recurrence were addressed.  Indication for Mohs was Size and Location. Prior to the procedure, the treatment site was clearly identified and, if available, confirmed with previous photos and confirmed by the patient   All components of the Universal Protocol/PAUSE rule were completed.  The Mohs surgeon operated in two distinct and integrated capacities as the surgeon and pathologist.      The area was prepped with Betasept.  A rim of normal appearing skin was marked circumferentially around the lesion.  The area was infiltrated with local anesthesia.  The tumor was first debulked to remove all clinically apparent tumor.  An incision following the standard Mohs approach was done and the specimen was oriented,mapped and placed in 1 block(s).  Each specimen was then chromacoded and processed in the Mohs laboratory using standard Mohs technique and submitted for frozen section histology.  Frozen section analysis showed no residual tumor but CLEAR MARGINS.      The tumor was excised using standard Mohs technique in 1 stages(s).  CLEAR  MARGINS OBTAINED and Final defect size was 2.4 x 2.2 cm.     We discussed the options for wound management in full with the patient including risks/benefits/ possible outcomes.        REPAIR SECOND INTENT: We discussed the options for wound management in full with the patient including risks/benefits/possible outcomes. Decision made to allow the wound to heal by second intention. EBL minimal; complications none; wound care routine.  The patient was discharged in good condition and will return in one month or prn for wound evaluation.

## 2021-03-16 NOTE — PATIENT INSTRUCTIONS
Open Wound Care     for ____scalp_____        ? No strenuous activity for 48 hours    ? Take Tylenol as needed for discomfort.                                                .         ? Do not drink alcoholic beverages for 48 hours.    ? Keep the pressure bandage in place for 24 hours. If the bandage becomes blood tinged or loose, reinforce it with gauze and tape.        (Refer to the reverse side of this page for management of bleeding).    ? Remove bandage in 24 hours and begin wound care as follows:     1. Clean area with tap water using a Q tip or gauze pad, (shower / bathe normally)  2. Dry wound with Q tip or gauze pad  3. Apply Aquaphor, Vaseline, Polysporin or Bacitracin Ointment with a Q tip    Do NOT use Neosporin Ointment *  4. Cover the wound with a band-aid or nonstick gauze pad and paper tape.  5. Repeat wound care once a day until wound is completely healed.    It is an old wives tale that a wound heals better when it is exposed to air and allowed to dry out. The wound will heal faster with a better cosmetic result if it is kept moist with ointment and covered with a bandage.  Do not let the wound dry out.      Supplies Needed:                Qtips or gauze pads                Polysporin or Bacitracin Ointment                Bandaids or nonstick gauze pads and paper tape    Wound care kits and brown paper tape are available for purchase at   the pharmacy.    BLEEDIN. Use tightly rolled up gauze or cloth to apply direct pressure over the bandage for 20   minutes.  2. Reapply pressure for an additional 20 minutes if necessary  3. Call the office or go to the nearest emergency room if pressure fails to stop the bleeding.  4. Use additional gauze and tape to maintain pressure once the bleeding has stopped.  5. Begin wound care 24 hours after surgery as directed.                  WOUND HEALING    1. One week after surgery a pink / red halo will form around the outside of the wound.   This is new  skin.  2. The center of the wound will appear yellowish white and produce some drainage.  3. The pink halo will slowly migrate in toward the center of the wound until the wound is covered with new shiny pink skin.  4. There will be no more drainage when the wound is completely healed.  5. It will take six months to one year for the redness to fade.  6. The scar may be itchy, tight and sensitive to extreme temperatures for a year after the surgery.  7. Massaging the area several times a day for several minutes after the wound is completely healed will help the scar soften and normalize faster. Begin massage only after healing is complete.      In case of emergency call: Dr Burnett: [phone]     [facility]: [phone]    [facility]: [phone]

## 2021-03-16 NOTE — LETTER
3/16/2021         RE: Jose Armando Brar  [address]  [facility] [address]        Dear Colleague,    Thank you for referring your patient, Jose Armando Brar, to the [facility]. Please see a copy of my visit note below.    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  Previous sites healing well.  HE notes spots on his back today.   .   Patient states this has been present for a while.  Patient reports the following symptoms:  none.  Patient reports the following previous treatments none.  These treatments did not work.  Patient reports the following modifying factors none.  Associated symptoms: none.  Patient has no other skin complaints today.  Remainder of the HPI, Meds, PMH, Allergies, FH, and SH was reviewed in chart.      Past Medical History:   Diagnosis Date     Squamous cell carcinoma of skin, unspecified        Past Surgical History:   Procedure Laterality Date     C OPEN RX SHLDR DISLOC,GR TUB FX  11/30/07    LT        History reviewed. No pertinent family history.    Social History     Socioeconomic History     Marital status:      Spouse name: Not on file     Number of children: Not on file     Years of education: Not on file     Highest education level: Not on file   Occupational History     Not on file   Social Needs     Financial resource strain: Not on file     Food insecurity     Worry: Not on file     Inability: Not on file     Transportation needs     Medical: Not on file     Non-medical: Not on file   Tobacco Use     Smoking status: Former Smoker     Packs/day: 0.00     Smokeless tobacco: Never Used   Substance and Sexual Activity     Alcohol use: Not on file     Drug use: Not on file     Sexual activity: Not on file   Lifestyle      Physical activity     Days per week: Not on file     Minutes per session: Not on file     Stress: Not on file   Relationships     Social connections     Talks on phone: Not on file     Gets together: Not on file     Attends [employer] service: Not on file     Active member of club or organization: Not on file     Attends meetings of clubs or organizations: Not on file     Relationship status: Not on file     Intimate partner violence     Fear of current or ex partner: Not on file     Emotionally abused: Not on file     Physically abused: Not on file     Forced sexual activity: Not on file   Other Topics Concern     Not on file   Social History Narrative     Not on file       Outpatient Encounter Medications as of 3/16/2021   Medication Sig Dispense Refill     glipiZIDE (GLUCOTROL XL) 5 MG 24 hr tablet        JANUVIA 100 MG tablet        pioglitazone (ACTOS) 45 MG tablet        simvastatin (ZOCOR) 40 MG tablet        TRULICITY 0.75 MG/0.5ML pen        No facility-administered encounter medications on file as of 3/16/2021.              Review Of Systems  Skin: As above  Eyes: negative  Ears/Nose/Throat: negative  Respiratory: No shortness of breath, dyspnea on exertion, cough, or hemoptysis  Cardiovascular: negative  Gastrointestinal: negative  Genitourinary: negative  Musculoskeletal: negative  Neurologic: negative  Psychiatric: negative  Hematologic/Lymphatic/Immunologic: negative  Endocrine: negative      O:   NAD, WDWN, Alert & Oriented, Mood & Affect wnl, Vitals stable   Here today alone   /79   Pulse 98   SpO2 97%    General appearance normal   Vitals stable   Alert, oriented and in no acute distress      Following lymph nodes palpated: Occipital, Cervical, Supraclavicular no lad   Mid ant vertex scalp ill-defined 1.6cm red scaly plaque      Stuck on papules and brown macules on trunk and ext      The remainder of expanded problem focused exam was normal; the following areas were examined:   scalp/hair, conjunctiva/lids, face, neck, back, chest, digits/nails, RUE, LUE.      Eyes: Conjunctivae/lids:Normal     ENT: Lips, buccal mucosa, tongue: normal    MSK:Normal    Cardiovascular: peripheral edema none    Pulm: Breathing Normal    Lymph Nodes: No Head and Neck Lymphadenopathy     Neuro/Psych: Orientation:Alert and Orientedx3 ; Mood/Affect:normal       A/P:  1. Seborrheic keratosis, lentigo,   2. Hx of non-melanoma skin cancer   3. Squamous cell carcinoma in situ scalp    It was a pleasure speaking to Jose Armando Brar today.    Nature of benign skin lesions dicussed with patient.  Signs and Symptoms of skin cancer discussed with patient.  Patient encouraged to perform monthly skin exams.  UV precautions reviewed with patient.  Risks of non-melanoma skin cancer discussed with patient   Return to clinic 6 months    PROCEDURE NOTE  Squamous cell carcinoma in situ scalp   MOHS:   Size and Location    The rationale for Mohs surgery was discussed with the patient and consent was obtained.  The risks and benefits as well as alternatives to therapy were discussed, in detail.  Specifically, the risks of infection, scarring, bleeding, prolonged wound healing, incomplete removal, allergy to anesthesia, nerve injury and recurrence were addressed.  Indication for Mohs was Size and Location. Prior to the procedure, the treatment site was clearly identified and, if available, confirmed with previous photos and confirmed by the patient   All components of the Universal Protocol/PAUSE rule were completed.  The Mohs surgeon operated in two distinct and integrated capacities as the surgeon and pathologist.      The area was prepped with Betasept.  A rim of normal appearing skin was marked circumferentially around the lesion.  The area was infiltrated with local anesthesia.  The tumor was first debulked to remove all clinically apparent tumor.  An incision following the standard Mohs approach was done and the specimen was  oriented,mapped and placed in 1 block(s).  Each specimen was then chromacoded and processed in the Mohs laboratory using standard Mohs technique and submitted for frozen section histology.  Frozen section analysis showed no residual tumor but CLEAR MARGINS.      The tumor was excised using standard Mohs technique in 1 stages(s).  CLEAR MARGINS OBTAINED and Final defect size was 2.4 x 2.2 cm.     We discussed the options for wound management in full with the patient including risks/benefits/ possible outcomes.        REPAIR SECOND INTENT: We discussed the options for wound management in full with the patient including risks/benefits/possible outcomes. Decision made to allow the wound to heal by second intention. EBL minimal; complications none; wound care routine.  The patient was discharged in good condition and will return in one month or prn for wound evaluation.        Again, thank you for allowing me to participate in the care of your patient.        Sincerely,        Dakota Burnett MD

## 2021-05-09 ENCOUNTER — HEALTH MAINTENANCE LETTER (OUTPATIENT)
Age: 61
End: 2021-05-09

## 2021-06-04 VITALS — BODY MASS INDEX: 22.66 KG/M2 | WEIGHT: 171 LBS | HEIGHT: 73 IN

## 2021-06-05 NOTE — ANESTHESIA CARE TRANSFER NOTE
Last vitals:   Vitals:    01/23/20 1237   BP: 156/86   Pulse: (!) 103   Resp: 12   Temp: 36.1  C (97  F)   SpO2: 100%     Patient's level of consciousness is drowsy  Spontaneous respirations: yes  Maintains airway independently: yes  Dentition unchanged: yes  Oropharynx: oropharynx clear of all foreign objects    QCDR Measures:  ASA# 20 - Surgical Safety Checklist: WHO surgical safety checklist completed prior to induction    PQRS# 430 - Adult PONV Prevention: 4558F - Pt received => 2 anti-emetic agents (different classes) preop & intraop  ASA# 8 - Peds PONV Prevention: NA - Not pediatric patient, not GA or 2 or more risk factors NOT present  PQRS# 424 - Tia-op Temp Management: 4559F - At least one body temp DOCUMENTED => 35.5C or 95.9F within required timeframe  PQRS# 426 - PACU Transfer Protocol: - Transfer of care checklist used  ASA# 14 - Acute Post-op Pain: ASA14B - Patient did NOT experience pain >= 7 out of 10

## 2021-06-05 NOTE — TELEPHONE ENCOUNTER
"Pt had kidney procedure (stent removal) ten days ago.  Procedure at [facility] 1/23/2020.    Newly diagnosed with UTi last night at an urgent care in [place].  \"Was painful to urinate.\"  Also had frequency/urgency.  Was given Rx Bactrim -160, to be taken twice daily for ten days.  Pt has taken two doses so far.  No fevers.    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  \"First day back following kidney stent removal.\"  \"Roads were bumpy.\"  \"Now urinating blood clots.\"  Three episodes clots so far today.    Advised immediate ED eval.  Pt agrees -> will go now.    Meredith Pryor RN  [facility] Triage     Reason for Disposition    Passing pure blood or large blood clots (i.e., larger than a dime or grape)    Protocols used: URINE - BLOOD IN-A-OH      "

## 2021-06-05 NOTE — TELEPHONE ENCOUNTER
-still having blood in the urine but no clots    -pain control is good.    - urinating fine    - hydrating well    - no fever    -some back pain only when voiding    Advised monitoring and calling back if any of his symptoms worsen.    Narcisa Fulton RN   [facility] Medication Refill and Triage Nurse  10:39 AM  1/26/2020        Reason for Disposition    Other post-op symptom or question    Protocols used: POST-OP SYMPTOMS AND [id number]

## 2021-06-05 NOTE — ANESTHESIA PREPROCEDURE EVALUATION
Anesthesia Evaluation      Patient summary reviewed   No history of anesthetic complications     Airway   Mallampati: II  Neck ROM: full   Pulmonary     breath sounds clear to auscultation  (+) sleep apnea on no CPAP, ,   (-) asthma, not a smoker                         Cardiovascular   Exercise tolerance: > or = 4 METS  (+) hypertension, ,     ECG reviewed (SR, QTc 401)  Rhythm: regular  Rate: normal,         Neuro/Psych - negative ROS   (-) no seizures, no CVA    Endo/Other    (+) diabetes mellitus,   (-) hypothyroidism, no obesity     GI/Hepatic/Renal    (+) GERD well controlled,        Other findings:   Hbg 14.7  T&S active, negative antibodies       Dental      Comment: Fair dentition, no loose or removable teeth                       Anesthesia Plan  Planned anesthetic: general endotracheal  Acetaminophen 1 g pre-op, ketamine 25 mg on induction, ketorolac 15 mg if ok w/ surgeon  Dexamethasone 4 mg, ondansetron 4 mg  ASA 2   Induction: intravenous   Anesthetic plan and risks discussed with: patient  Anesthesia plan special considerations: antiemetics,   Post-op plan: routine recovery

## 2021-06-05 NOTE — ANESTHESIA POSTPROCEDURE EVALUATION
Patient: Jose Armando Brar  Procedure(s):  RIGHT PERCUTANEOUS NEPHROLITHOTOMY (Right)  Anesthesia type: general    Patient location: PACU  Last vitals:   Vitals Value Taken Time   /70 1/23/2020  3:30 PM   Temp 36.6  C (97.8  F) 1/23/2020  3:30 PM   Pulse 70 1/23/2020  3:30 PM   Resp 16 1/23/2020  3:30 PM   SpO2 98 % 1/23/2020  3:30 PM     Post vital signs: stable  Level of consciousness: awake and responds to simple questions  Post-anesthesia pain: pain controlled  Post-anesthesia nausea and vomiting: no  Pulmonary: unassisted, return to baseline  Cardiovascular: stable and blood pressure at baseline  Hydration: adequate  Anesthetic events: no    QCDR Measures:  ASA# 11 - Tia-op Cardiac Arrest: ASA11B - Patient did NOT experience unanticipated cardiac arrest  ASA# 12 - Tia-op Mortality Rate: ASA12B - Patient did NOT die  ASA# 13 - PACU Re-Intubation Rate: ASA13B - Patient did NOT require a new airway mgmt  ASA# 10 - Composite Anes Safety: ASA10A - No serious adverse event    Additional Notes:

## 2021-08-29 ENCOUNTER — HEALTH MAINTENANCE LETTER (OUTPATIENT)
Age: 61
End: 2021-08-29

## 2021-10-24 ENCOUNTER — HEALTH MAINTENANCE LETTER (OUTPATIENT)
Age: 61
End: 2021-10-24

## 2021-11-09 ENCOUNTER — OFFICE VISIT (OUTPATIENT)
Dept: DERMATOLOGY | Facility: CLINIC | Age: 61
End: 2021-11-09
Payer: COMMERCIAL

## 2021-11-09 VITALS
OXYGEN SATURATION: 99 % | SYSTOLIC BLOOD PRESSURE: 126 MMHG | BODY MASS INDEX: 22.56 KG/M2 | HEART RATE: 98 BPM | DIASTOLIC BLOOD PRESSURE: 78 MMHG | HEIGHT: 73 IN

## 2021-11-09 DIAGNOSIS — L57.0 AK (ACTINIC KERATOSIS): ICD-10-CM

## 2021-11-09 DIAGNOSIS — Z85.828 HISTORY OF SKIN CANCER: Primary | ICD-10-CM

## 2021-11-09 DIAGNOSIS — L82.1 SEBORRHEIC KERATOSIS: ICD-10-CM

## 2021-11-09 DIAGNOSIS — L81.4 LENTIGO: ICD-10-CM

## 2021-11-09 PROCEDURE — 99213 OFFICE O/P EST LOW 20 MIN: CPT | Mod: 25 | Performed by: DERMATOLOGY

## 2021-11-09 PROCEDURE — 17003 DESTRUCT PREMALG LES 2-14: CPT | Performed by: DERMATOLOGY

## 2021-11-09 PROCEDURE — 17000 DESTRUCT PREMALG LESION: CPT | Performed by: DERMATOLOGY

## 2021-11-09 RX ORDER — FLUOROURACIL 50 MG/G
CREAM TOPICAL
Qty: 40 G | Refills: 1 | Status: SHIPPED | OUTPATIENT
Start: 2021-11-09 | End: 2024-04-26

## 2021-11-09 RX ORDER — CALCIPOTRIENE 50 UG/G
CREAM TOPICAL
Qty: 60 G | Refills: 3 | Status: SHIPPED | OUTPATIENT
Start: 2021-11-09 | End: 2024-04-26

## 2021-11-09 NOTE — LETTER
11/9/2021         RE: Jose Armando Brar  [address] [facility] [address]        Dear Colleague,    Thank you for referring your patient, Jose Armando Brar, to the [facility]. Please see a copy of my visit note below.    Jose Armando Brar is an extremely pleasant 61 year old year old male patient here today for rough spot son scalp.   .   Patient states this has been present for a while.  Patient reports the following symptoms:  scale.  Patient reports the following previous treatments none.  These treatments did not work.  Patient reports the following modifying factors none.  Associated symptoms: none.  Patient has no other skin complaints today.  Remainder of the HPI, Meds, PMH, Allergies, FH, and SH was reviewed in chart.      Past Medical History:   Diagnosis Date     Essential hypertension      Squamous cell carcinoma of skin, unspecified      Type 2 diabetes mellitus (H)        Past Surgical History:   Procedure Laterality Date     C OPEN RX SHLDR DISLOC,GR TUB FX  11/30/07    LT     HERNIA REPAIR       IR NEPHROLITHOTOMY  1/23/2020     IR NEPHROLITHOTOMY  1/23/2020     KNEE SURGERY       NEPHROLITHOTOMY Right 1/23/2020    Procedure: RIGHT PERCUTANEOUS NEPHROLITHOTOMY;  Surgeon: Vinicius Saenz MD;  Location: [facility];  Service: Urology     OTHER SURGICAL HISTORY      sleep apnea surgery     SHOULDER SURGERY      x3        History reviewed. No pertinent family history.    Social History     Socioeconomic History     Marital status:      Spouse name: Not on file     Number of children: Not on file     Years of education: Not on file     Highest education level: Not on file   Occupational History     Not on file   Tobacco Use     Smoking status: Former Smoker     Packs/day: 0.00     Smokeless tobacco: Never Used   Substance and Sexual Activity     Alcohol use: Not on file     Drug use: Not on file     Sexual activity: Not on file   Other Topics Concern  "    Not on file   Social History Narrative     Not on file     Social Determinants of Health     Financial Resource Strain: Not on file   Food Insecurity: Not on file   Transportation Needs: Not on file   Physical Activity: Not on file   Stress: Not on file   Social Connections: Not on file   Intimate Partner Violence: Not on file   Housing Stability: Not on file       Outpatient Encounter Medications as of 11/9/2021   Medication Sig Dispense Refill     glipiZIDE (GLUCOTROL XL) 5 MG 24 hr tablet        JANUVIA 100 MG tablet        pioglitazone (ACTOS) 45 MG tablet        simvastatin (ZOCOR) 40 MG tablet        TRULICITY 0.75 MG/0.5ML pen        No facility-administered encounter medications on file as of 11/9/2021.             O:   NAD, WDWN, Alert & Oriented, Mood & Affect wnl, Vitals stable   Here today alone   /78   Pulse 98   Ht 1.854 m (6' 1\")   SpO2 99%   BMI 22.56 kg/m     General appearance normal   Vitals stable   Alert, oriented and in no acute distress     Gritty papules on scalp     Stuck on papules and brown macules on trunk and ext      The remainder of expanded problem focused exam was normal; the following areas were examined:  scalp/hair, conjunctiva/lids, face, neck, lips, chest, digits/nails, RUE, LUE.      Eyes: Conjunctivae/lids:Normal     ENT: Lips, buccal mucosa, tongue: normal    MSK:Normal    Cardiovascular: peripheral edema none    Pulm: Breathing Normal    Lymph Nodes: No Head and Neck Lymphadenopathy     Neuro/Psych: Orientation:Alert and Orientedx3 ; Mood/Affect:normal       A/P:  1. Seborrheic keratosis, lentigo,   2. Hx of non-melanoma skin cancer   3. Actinic keratosis   6 lesions  LN2:  Treated with LN2 for 5s for 1-2 cycles. Warned risks of blistering, pain, pigment change, scarring, and incomplete resolution.  Advised patient to return if lesions do not completely resolve.  Wound care sheet given.  Using 5-Flurouracil Cream    5-Fluorouracil (5FU) topical cream (brand " names Efudex, Carac) is a prescription topical medicine to treat actinic keratoses (pre-squamous cell skin cancer lesions), sun-damaged skin as well as superficial skin cancers.    When applied the areas of sun-damaged skin, the 5FU will  find  damaged skin cells and destroy them.   During treatment, the skin will become red and look very irritated. This is the expected  normal response,  Some patients using 5FU show minimal redness and scaling while others have a very  vigorous  response where the skin scabs and peels. The important thing to realize is that 5FU is treating sun-damaged skin that carries skin cancer risk.      While the skin is irritated, open, sore or scabbed you can apply aquaphor, vaseline or 1% hydrocortisone cream in the morning.     You should apply a thin layer of the cream to the affected area twice a day for 2  weeks every night. A strip of cream the length of your finger tip should be enough to cover your entire face.  For tougher skin like arms, legs, or back, we may suggest longer treatment plans.  However if you react really strong and fast, you might stop earlier or use less frequently. - please call if it is very strong and you are concern you might need to stop early.     If you prescription coverage allows we may add calcipotriene (Dovonex ), a vitamin-D derivative, to the treatment plan.  In these cases we will have you mix the calcipotriene with the efudex to help shorten the treatment course and improve outcomes.      Typically very strong reactions are related to lots of underlying sun damage, and this means you are getting a good response to the medication. However, there is no need to be miserable while using this. Please let us know if you are having trouble or concerns!    It was a pleasure speaking to Jose Armando Brar today.  Previous clinic notes and pertinent laboratory tests were reviewed prior to Jose Armando Brar's visit.  Signs and Symptoms of skin cancer discussed with  patient.  Patient encouraged to perform monthly skin exams.  UV precautions reviewed with patient.  Return to clinic 2 months        Again, thank you for allowing me to participate in the care of your patient.        Sincerely,        Dakota Burnett MD

## 2021-11-09 NOTE — PROGRESS NOTES
Jose Armando Brar is an extremely pleasant 61 year old year old male patient here today for rough spot son scalp.   .   Patient states this has been present for a while.  Patient reports the following symptoms:  scale.  Patient reports the following previous treatments none.  These treatments did not work.  Patient reports the following modifying factors none.  Associated symptoms: none.  Patient has no other skin complaints today.  Remainder of the HPI, Meds, PMH, Allergies, FH, and SH was reviewed in chart.      Past Medical History:   Diagnosis Date     Essential hypertension      Squamous cell carcinoma of skin, unspecified      Type 2 diabetes mellitus (H)        Past Surgical History:   Procedure Laterality Date     C OPEN RX SHLDR DISLOC,GR TUB FX  11/30/07    LT     HERNIA REPAIR       IR NEPHROLITHOTOMY  1/23/2020     IR NEPHROLITHOTOMY  1/23/2020     KNEE SURGERY       NEPHROLITHOTOMY Right 1/23/2020    Procedure: RIGHT PERCUTANEOUS NEPHROLITHOTOMY;  Surgeon: Vinicius Saenz MD;  Location: [facility];  Service: Urology     OTHER SURGICAL HISTORY      sleep apnea surgery     SHOULDER SURGERY      x3        History reviewed. No pertinent family history.    Social History     Socioeconomic History     Marital status:      Spouse name: Not on file     Number of children: Not on file     Years of education: Not on file     Highest education level: Not on file   Occupational History     Not on file   Tobacco Use     Smoking status: Former Smoker     Packs/day: 0.00     Smokeless tobacco: Never Used   Substance and Sexual Activity     Alcohol use: Not on file     Drug use: Not on file     Sexual activity: Not on file   Other Topics Concern     Not on file   Social History Narrative     Not on file     Social Determinants of Health     Financial Resource Strain: Not on file   Food Insecurity: Not on file   Transportation Needs: Not on file   Physical Activity: Not on file   Stress: Not on  "file   Social Connections: Not on file   Intimate Partner Violence: Not on file   Housing Stability: Not on file       Outpatient Encounter Medications as of 11/9/2021   Medication Sig Dispense Refill     glipiZIDE (GLUCOTROL XL) 5 MG 24 hr tablet        JANUVIA 100 MG tablet        pioglitazone (ACTOS) 45 MG tablet        simvastatin (ZOCOR) 40 MG tablet        TRULICITY 0.75 MG/0.5ML pen        No facility-administered encounter medications on file as of 11/9/2021.             O:   NAD, WDWN, Alert & Oriented, Mood & Affect wnl, Vitals stable   Here today alone   /78   Pulse 98   Ht 1.854 m (6' 1\")   SpO2 99%   BMI 22.56 kg/m     General appearance normal   Vitals stable   Alert, oriented and in no acute distress     Gritty papules on scalp     Stuck on papules and brown macules on trunk and ext      The remainder of expanded problem focused exam was normal; the following areas were examined:  scalp/hair, conjunctiva/lids, face, neck, lips, chest, digits/nails, RUE, LUE.      Eyes: Conjunctivae/lids:Normal     ENT: Lips, buccal mucosa, tongue: normal    MSK:Normal    Cardiovascular: peripheral edema none    Pulm: Breathing Normal    Lymph Nodes: No Head and Neck Lymphadenopathy     Neuro/Psych: Orientation:Alert and Orientedx3 ; Mood/Affect:normal       A/P:  1. Seborrheic keratosis, lentigo,   2. Hx of non-melanoma skin cancer   3. Actinic keratosis   6 lesions  LN2:  Treated with LN2 for 5s for 1-2 cycles. Warned risks of blistering, pain, pigment change, scarring, and incomplete resolution.  Advised patient to return if lesions do not completely resolve.  Wound care sheet given.  Using 5-Flurouracil Cream    5-Fluorouracil (5FU) topical cream (brand names Efudex, Carac) is a prescription topical medicine to treat actinic keratoses (pre-squamous cell skin cancer lesions), sun-damaged skin as well as superficial skin cancers.    When applied the areas of sun-damaged skin, the 5FU will  find  damaged " skin cells and destroy them.   During treatment, the skin will become red and look very irritated. This is the expected  normal response,  Some patients using 5FU show minimal redness and scaling while others have a very  vigorous  response where the skin scabs and peels. The important thing to realize is that 5FU is treating sun-damaged skin that carries skin cancer risk.      While the skin is irritated, open, sore or scabbed you can apply aquaphor, vaseline or 1% hydrocortisone cream in the morning.     You should apply a thin layer of the cream to the affected area twice a day for 2  weeks every night. A strip of cream the length of your finger tip should be enough to cover your entire face.  For tougher skin like arms, legs, or back, we may suggest longer treatment plans.  However if you react really strong and fast, you might stop earlier or use less frequently. - please call if it is very strong and you are concern you might need to stop early.     If you prescription coverage allows we may add calcipotriene (Dovonex ), a vitamin-D derivative, to the treatment plan.  In these cases we will have you mix the calcipotriene with the efudex to help shorten the treatment course and improve outcomes.      Typically very strong reactions are related to lots of underlying sun damage, and this means you are getting a good response to the medication. However, there is no need to be miserable while using this. Please let us know if you are having trouble or concerns!    It was a pleasure speaking to Jose Armando Brar today.  Previous clinic notes and pertinent laboratory tests were reviewed prior to Jose Armando Brar's visit.  Signs and Symptoms of skin cancer discussed with patient.  Patient encouraged to perform monthly skin exams.  UV precautions reviewed with patient.  Return to clinic 2 months

## 2021-12-19 ENCOUNTER — HEALTH MAINTENANCE LETTER (OUTPATIENT)
Age: 61
End: 2021-12-19

## 2022-02-13 ENCOUNTER — HEALTH MAINTENANCE LETTER (OUTPATIENT)
Age: 62
End: 2022-02-13

## 2022-04-10 ENCOUNTER — HEALTH MAINTENANCE LETTER (OUTPATIENT)
Age: 62
End: 2022-04-10

## 2022-07-31 ENCOUNTER — HEALTH MAINTENANCE LETTER (OUTPATIENT)
Age: 62
End: 2022-07-31

## 2022-10-16 ENCOUNTER — HEALTH MAINTENANCE LETTER (OUTPATIENT)
Age: 62
End: 2022-10-16

## 2022-12-03 ENCOUNTER — HEALTH MAINTENANCE LETTER (OUTPATIENT)
Age: 62
End: 2022-12-03

## 2023-03-26 ENCOUNTER — HEALTH MAINTENANCE LETTER (OUTPATIENT)
Age: 63
End: 2023-03-26

## 2023-05-23 ENCOUNTER — ANESTHESIA EVENT (OUTPATIENT)
Dept: SURGERY | Facility: AMBULATORY SURGERY CENTER | Age: 63
End: 2023-05-23
Payer: COMMERCIAL

## 2023-05-24 ENCOUNTER — ANESTHESIA (OUTPATIENT)
Dept: SURGERY | Facility: AMBULATORY SURGERY CENTER | Age: 63
End: 2023-05-24
Payer: COMMERCIAL

## 2023-05-24 ENCOUNTER — HOSPITAL ENCOUNTER (OUTPATIENT)
Facility: AMBULATORY SURGERY CENTER | Age: 63
Discharge: HOME | End: 2023-05-24
Attending: COLON & RECTAL SURGERY
Payer: COMMERCIAL

## 2023-05-24 VITALS
SYSTOLIC BLOOD PRESSURE: 149 MMHG | RESPIRATION RATE: 16 BRPM | TEMPERATURE: 96.8 F | OXYGEN SATURATION: 98 % | DIASTOLIC BLOOD PRESSURE: 80 MMHG | HEART RATE: 77 BPM | BODY MASS INDEX: 24.38 KG/M2 | HEIGHT: 72 IN | WEIGHT: 180 LBS

## 2023-05-24 DIAGNOSIS — K63.5 COLON POLYP: ICD-10-CM

## 2023-05-24 LAB
COLONOSCOPY: NORMAL
GLUCOSE BLDC GLUCOMTR-MCNC: 167 MG/DL (ref 70–99)
GLUCOSE SERPL-MCNC: 113 MG/DL (ref 70–99)

## 2023-05-24 RX ORDER — LIDOCAINE HYDROCHLORIDE 20 MG/ML
INJECTION, SOLUTION INFILTRATION; PERINEURAL PRN
Status: DISCONTINUED | OUTPATIENT
Start: 2023-05-24 | End: 2023-05-24

## 2023-05-24 RX ORDER — OXYCODONE HYDROCHLORIDE 5 MG/1
5 TABLET ORAL
Status: DISCONTINUED | OUTPATIENT
Start: 2023-05-24 | End: 2023-05-25 | Stop reason: HOSPADM

## 2023-05-24 RX ORDER — ACETAMINOPHEN 325 MG/1
975 TABLET ORAL ONCE
Status: DISCONTINUED | OUTPATIENT
Start: 2023-05-24 | End: 2023-05-25 | Stop reason: HOSPADM

## 2023-05-24 RX ORDER — ONDANSETRON 2 MG/ML
4 INJECTION INTRAMUSCULAR; INTRAVENOUS EVERY 30 MIN PRN
Status: DISCONTINUED | OUTPATIENT
Start: 2023-05-24 | End: 2023-05-25 | Stop reason: HOSPADM

## 2023-05-24 RX ORDER — PROPOFOL 10 MG/ML
INJECTION, EMULSION INTRAVENOUS PRN
Status: DISCONTINUED | OUTPATIENT
Start: 2023-05-24 | End: 2023-05-24

## 2023-05-24 RX ORDER — ONDANSETRON 4 MG/1
4 TABLET, ORALLY DISINTEGRATING ORAL
Status: DISCONTINUED | OUTPATIENT
Start: 2023-05-24 | End: 2023-05-25 | Stop reason: HOSPADM

## 2023-05-24 RX ORDER — GLYCOPYRROLATE 0.2 MG/ML
INJECTION, SOLUTION INTRAMUSCULAR; INTRAVENOUS PRN
Status: DISCONTINUED | OUTPATIENT
Start: 2023-05-24 | End: 2023-05-24

## 2023-05-24 RX ORDER — OXYCODONE HYDROCHLORIDE 10 MG/1
10 TABLET ORAL
Status: DISCONTINUED | OUTPATIENT
Start: 2023-05-24 | End: 2023-05-25 | Stop reason: HOSPADM

## 2023-05-24 RX ORDER — PROPOFOL 10 MG/ML
INJECTION, EMULSION INTRAVENOUS CONTINUOUS PRN
Status: DISCONTINUED | OUTPATIENT
Start: 2023-05-24 | End: 2023-05-24

## 2023-05-24 RX ORDER — LIDOCAINE 40 MG/G
CREAM TOPICAL
Status: DISCONTINUED | OUTPATIENT
Start: 2023-05-24 | End: 2023-05-25 | Stop reason: HOSPADM

## 2023-05-24 RX ORDER — ONDANSETRON 4 MG/1
4 TABLET, ORALLY DISINTEGRATING ORAL EVERY 30 MIN PRN
Status: DISCONTINUED | OUTPATIENT
Start: 2023-05-24 | End: 2023-05-25 | Stop reason: HOSPADM

## 2023-05-24 RX ORDER — SODIUM CHLORIDE, SODIUM LACTATE, POTASSIUM CHLORIDE, CALCIUM CHLORIDE 600; 310; 30; 20 MG/100ML; MG/100ML; MG/100ML; MG/100ML
INJECTION, SOLUTION INTRAVENOUS CONTINUOUS
Status: DISCONTINUED | OUTPATIENT
Start: 2023-05-24 | End: 2023-05-25 | Stop reason: HOSPADM

## 2023-05-24 RX ADMIN — GLYCOPYRROLATE 0.2 MG: 0.2 INJECTION, SOLUTION INTRAMUSCULAR; INTRAVENOUS at 10:25

## 2023-05-24 RX ADMIN — SODIUM CHLORIDE, SODIUM LACTATE, POTASSIUM CHLORIDE, CALCIUM CHLORIDE: 600; 310; 30; 20 INJECTION, SOLUTION INTRAVENOUS at 10:10

## 2023-05-24 RX ADMIN — PROPOFOL 40 MG: 10 INJECTION, EMULSION INTRAVENOUS at 10:25

## 2023-05-24 RX ADMIN — LIDOCAINE HYDROCHLORIDE 3 ML: 20 INJECTION, SOLUTION INFILTRATION; PERINEURAL at 10:25

## 2023-05-24 RX ADMIN — PROPOFOL 200 MCG/KG/MIN: 10 INJECTION, EMULSION INTRAVENOUS at 10:25

## 2023-05-24 NOTE — ANESTHESIA PREPROCEDURE EVALUATION
Anesthesia Pre-Procedure Evaluation    Patient: Jose Armando Brar   MRN: [id number] : 1960        Procedure : Procedure(s):  COLONOSCOPY          Past Medical History:   Diagnosis Date     Essential hypertension      Squamous cell carcinoma of skin, unspecified      Type 2 diabetes mellitus (H)       Past Surgical History:   Procedure Laterality Date     HERNIA REPAIR       IR NEPHROLITHOTOMY  2020     IR NEPHROLITHOTOMY  2020     KNEE SURGERY       NEPHROLITHOTOMY Right 2020    Procedure: RIGHT PERCUTANEOUS NEPHROLITHOTOMY;  Surgeon: Vinicius Saenz MD;  Location: [facility];  Service: Urology     OTHER SURGICAL HISTORY      sleep apnea surgery     SHOULDER SURGERY      x3     ZZC OPEN RX SHLDR DISLOC,GR TUB FX  07    LT      Allergies   Allergen Reactions     Nkda [No Known Drug Allergy]      Noted in 07 ER      Social History     Tobacco Use     Smoking status: Former     Packs/day: 0.00     Types: Cigarettes     Smokeless tobacco: Never   Vaping Use     Vaping status: Not on file   Substance Use Topics     Alcohol use: Not Currently      Wt Readings from Last 1 Encounters:   23 81.6 kg (180 lb)        Anesthesia Evaluation            ROS/MED HX  ENT/Pulmonary:  - neg pulmonary ROS     Neurologic:  - neg neurologic ROS     Cardiovascular:     (+) hypertension-----    METS/Exercise Tolerance: >4 METS    Hematologic:  - neg hematologic  ROS     Musculoskeletal:  - neg musculoskeletal ROS     GI/Hepatic:  - neg GI/hepatic ROS     Renal/Genitourinary:       Endo:     (+) type II DM,     Psychiatric/Substance Use:  - neg psychiatric ROS     Infectious Disease:  - neg infectious disease ROS     Malignancy:  - neg malignancy ROS     Other:  - neg other ROS          Physical Exam    Airway  airway exam normal      Mallampati: II       Respiratory Devices and Support         Dental           Cardiovascular   cardiovascular exam normal       Rhythm and rate: regular  and normal     Pulmonary   pulmonary exam normal        breath sounds clear to auscultation           OUTSIDE LABS:  CBC:   Lab Results   Component Value Date    WBC 11.9 (H) 11/30/2007    HGB 14.2 01/24/2020    HGB 14.7 01/23/2020    HCT 44.2 11/30/2007     11/30/2007     BMP:   Lab Results   Component Value Date     01/24/2020     11/30/2007    POTASSIUM 4.6 01/24/2020    POTASSIUM 4.1 11/30/2007    CHLORIDE 103 01/24/2020    CHLORIDE 105 11/30/2007    CO2 30 01/24/2020    CO2 25 11/30/2007    BUN 14 01/24/2020    BUN 16 11/30/2007    CR 0.95 01/24/2020    CR 0.89 11/30/2007     (H) 01/24/2020     (H) 01/24/2020     COAGS: No results found for: PTT, INR, FIBR  POC: No results found for: BGM, HCG, HCGS  HEPATIC: No results found for: ALBUMIN, PROTTOTAL, ALT, AST, GGT, ALKPHOS, BILITOTAL, BILIDIRECT, REBA  OTHER:   Lab Results   Component Value Date    NICK 8.6 01/24/2020       Anesthesia Plan    ASA Status:  2      Anesthesia Type: MAC.   Induction: Intravenous, Propofol.   Maintenance: TIVA.        Consents    Anesthesia Plan(s) and associated risks, benefits, and realistic alternatives discussed. Questions answered and patient/representative(s) expressed understanding.    - Discussed:     - Discussed with:  Patient      - Extended Intubation/Ventilatory Support Discussed: No.      - Patient is DNR/DNI Status: No    Use of blood products discussed: No .     Postoperative Care    Pain management: IV analgesics.   PONV prophylaxis: Ondansetron (or other 5HT-3), Dexamethasone or Solumedrol     Comments:    Other Comments: The patient understands and accepts the risks of MAC anesthesia including (but not limited to) nausea, vomiting, dizziness, and chipped teeth. I also discussed the possibility of conversion to GAETT/GALMA anesthesia which include hoarse voice, sore throat, and pinched lip or chipped teeth.  propofol ggt  Decadron/zofran            Raghav Smith MD

## 2023-05-24 NOTE — H&P
[facility]   History and Physical       History of Present Illness: 62 year old male w/ hx of multiple right sided polyps with high grade dysplasia as well as a previously unresectable polyp of the proximal transverse colon.     Past Medical History:   Diagnosis Date     Essential hypertension      Squamous cell carcinoma of skin, unspecified      Type 2 diabetes mellitus (H)        Allergies   Allergen Reactions     Nkda [No Known Drug Allergy]      Noted in 11/30/07 ER       Past Surgical History:   Procedure Laterality Date     HERNIA REPAIR       IR NEPHROLITHOTOMY  1/23/2020     IR NEPHROLITHOTOMY  1/23/2020     KNEE SURGERY       NEPHROLITHOTOMY Right 1/23/2020    Procedure: RIGHT PERCUTANEOUS NEPHROLITHOTOMY;  Surgeon: Vinicius Saenz MD;  Location: [facility];  Service: Urology     OTHER SURGICAL HISTORY      sleep apnea surgery     SHOULDER SURGERY      x3     ZZC OPEN RX SHLDR DISLOC,GR TUB FX  11/30/07    LT       History reviewed. No pertinent family history.    Social History     Socioeconomic History     Marital status:      Spouse name: Not on file     Number of children: Not on file     Years of education: Not on file     Highest education level: Not on file   Occupational History     Not on file   Tobacco Use     Smoking status: Former     Packs/day: 0.00     Types: Cigarettes     Smokeless tobacco: Never   Vaping Use     Vaping status: Not on file   Substance and Sexual Activity     Alcohol use: Not Currently     Drug use: Never     Sexual activity: Not on file   Other Topics Concern     Not on file   Social History Narrative     Not on file     Social Determinants of Health     Financial Resource Strain: Not on file   Food Insecurity: Not on file   Transportation Needs: Not on file   Physical Activity: Not on file   Stress: Not on file   Social Connections: Not on file   Intimate Partner Violence: Not on file   Housing Stability: Not on file        Current Outpatient Medications   Medication     calcipotriene (DOVONEX) 0.005 % external cream     fluorouracil (EFUDEX) 5 % external cream     glipiZIDE (GLUCOTROL XL) 5 MG 24 hr tablet     JANUVIA 100 MG tablet     pioglitazone (ACTOS) 45 MG tablet     simvastatin (ZOCOR) 40 MG tablet     TRULICITY 0.75 MG/0.5ML pen     Current Facility-Administered Medications   Medication     lactated ringers infusion     lidocaine (LMX4) kit     lidocaine 1 % 0.1-1 mL     PRE OP antibiotics NOT needed for this surgical procedure     sodium chloride (PF) 0.9% PF flush 3 mL     sodium chloride (PF) 0.9% PF flush 3 mL       Review of Systems:   A full 10 point review of systems was taken and is negative aside from what is noted above in the HPI.    Consitutional / General: Denies wt changes, fevers, chills or sweats.  Skin: Denies rashes, bruising, pruritis, or bleeding tendencies.   ENT: Denies trauma, headache, hearing loss, tinnitus, dysphagia  Eyes: Denies double vision  Respiratory: Denies SOB, cough, sputum production or dyspnea on exertion.   Cardiovascular: Denies chest pain, palpitations, orthostatic hypotension  Hematology / Lymph: Denies hx of blood clots or pulmonary embolism  Gastrointestinal:  Denies loss of appetite, dysphagia, N/V, constipation or diarrhea.   Genitourinary: Denies dysuria, frequency, urgency, hesitancy, incontinence, nocturia, hematuria, difficulty starting or stopping their stream, hx of stones or hx of uti's.   Neurologic: Denies headache, LOC, memory loss, vertigo, syncope or seizures.   Musculoskeletal: Denies back pain, numbness, tingling or hx of back surgery  Psychological: alert and oriented    Intake/Output past 24 hrs:  No intake or output data in the 24 hours ending 05/24/23 1015    Physical Exam:  Temp:  [97  F (36.1  C)] 97  F (36.1  C)  Pulse:  [81] 81  Resp:  [16] 16  BP: (124)/(75) 124/75  SpO2:  [99 %] 99 %  General: NAD, alert, cooperative  Head: normocephalic, without  abnormality / atraumatic  Respiratory: non-labored breathing, CTA-B  Cardiac: RRR +S1/S2  Abdomen: soft, non-tender, non-distended.  No guarding or rebound   Perianal/Rectal: deferred   Skin: no rashes or lesions  Musculoskeletal: moves all four extremities equally; no calf edema or tenderness  Psychological: alert and oriented, answers questions appropriately  Neurological: cranial nerves grossly intact    CBC RESULTS:   Recent Labs   Lab Test 01/24/20  0800   HGB 14.2       Last Comprehensive Metabolic Panel:  Sodium   Date Value Ref Range Status   01/24/2020 138 136 - 145 mmol/L Final   11/30/2007 137 133 - 144 mmol/L Final     Potassium   Date Value Ref Range Status   01/24/2020 4.6 3.5 - 5.0 mmol/L Final   11/30/2007 4.1 3.4 - 5.3 mmol/L Final     Chloride   Date Value Ref Range Status   01/24/2020 103 98 - 107 mmol/L Final   11/30/2007 105 94 - 109 mmol/L Final     Carbon Dioxide   Date Value Ref Range Status   11/30/2007 25 20 - 32 mmol/L Final     Carbon Dioxide (CO2)   Date Value Ref Range Status   01/24/2020 30 22 - 31 mmol/L Final     Anion Gap   Date Value Ref Range Status   01/24/2020 5 5 - 18 mmol/L Final   11/30/2007 7 6 - 17 mmol/L Final     Glucose   Date Value Ref Range Status   01/24/2020 182 (H) 70 - 125 mg/dL Final   11/30/2007 179 (H) 60 - 99 mg/dL Final     GLUCOSE BY METER POCT   Date Value Ref Range Status   05/24/2023 167 (A) 70 - 99 mg/dL Final     Comment:     lot: [id number] exp: 12/3/23     Urea Nitrogen   Date Value Ref Range Status   01/24/2020 14 8 - 22 mg/dL Final   11/30/2007 16 5 - 24 mg/dL Final     Creatinine   Date Value Ref Range Status   01/24/2020 0.95 0.70 - 1.30 mg/dL Final   11/30/2007 0.89 0.80 - 1.50 mg/dL Final     GFR Estimate   Date Value Ref Range Status   01/24/2020 >60 >60 mL/min/1.73m2 Final   11/30/2007 >90 >60 mL/min/1.7m2 Final     Calcium   Date Value Ref Range Status   01/24/2020 8.6 8.5 - 10.5 mg/dL Final   11/30/2007 8.2 (L) 8.5 - 10.4 mg/dL Final        Assessment: Jose Armando Brar is a 62 year old male w/ a history of adenomatous colon polyps.     Plan: Colonoscopy under MAC. I will see if I can remove this transverse colon polyp myself. If not, we will at least make sure the left side of the colon is clear so we can do a right/extended right colectomy.     Elbert Wong MD, ELADIO  [facility]  Office: [phone]  5/24/2023 10:15 AM

## 2023-05-24 NOTE — ANESTHESIA POSTPROCEDURE EVALUATION
Patient: Jose Armando Brar    Procedure: Procedure(s):  COLONOSCOPY WITH POLYPECTOMY       Anesthesia Type:  MAC    Note:  Disposition: Outpatient   Postop Pain Control: Uneventful            Sign Out: Well controlled pain   PONV: No   Neuro/Psych: Uneventful            Sign Out: Acceptable/Baseline neuro status   Airway/Respiratory: Uneventful            Sign Out: Acceptable/Baseline resp. status   CV/Hemodynamics: Uneventful            Sign Out: Acceptable CV status; No obvious hypovolemia; No obvious fluid overload   Other NRE:    DID A NON-ROUTINE EVENT OCCUR?            Last vitals:  Vitals Value Taken Time   /83 05/24/23 1201   Temp 96.8  F (36  C) 05/24/23 1146   Pulse 73 05/24/23 1210   Resp 16 05/24/23 1200   SpO2 99 % 05/24/23 1210   Vitals shown include unvalidated device data.    Electronically Signed By: Raghav Smith MD  May 24, 2023  12:32 PM

## 2023-05-24 NOTE — ANESTHESIA CARE TRANSFER NOTE
Patient: Jose Armando Brar    Procedure: Procedure(s):  COLONOSCOPY WITH POLYPECTOMY       Diagnosis: Colon polyp [K63.5]  Diagnosis Additional Information: No value filed.    Anesthesia Type:   MAC     Note:    Oropharynx: oropharynx clear of all foreign objects and spontaneously breathing  Level of Consciousness: drowsy  Oxygen Supplementation: face mask  Level of Supplemental Oxygen (L/min / FiO2): 6  Independent Airway: airway patency satisfactory and stable  Dentition: dentition unchanged  Vital Signs Stable: post-procedure vital signs reviewed and stable  Report to RN Given: handoff report given  Patient transferred to: Phase II    Handoff Report: Identifed the Patient, Identified the Reponsible Provider, Reviewed the pertinent medical history, Discussed the surgical course, Reviewed Intra-OP anesthesia mangement and issues during anesthesia, Set expectations for post-procedure period and Allowed opportunity for questions and acknowledgement of understanding      Vitals:  Vitals Value Taken Time   /81 05/24/23 1146   Temp 96.8  F (36  C) 05/24/23 1146   Pulse 71 05/24/23 1146   Resp 16 05/24/23 1146   SpO2 100 % 05/24/23 1146       Electronically Signed By: MICAH Glynn CRNA  May 24, 2023  11:47 AM   10

## 2023-05-24 NOTE — DISCHARGE INSTRUCTIONS
Post-Colonoscopy Discharge Instructions:    1. You have just had an examination of your colon (large intestine) called a colonoscopy. You should have a full report of the findings to take with you today. It will list if any polyps were removed or if any biopsies were taken to send to the laboratory. If we did take out polyps or do biopsies, you should get a letter in the next 1-2 weeks if they are the standard pre-cancerous polyps. If it is anything more serious, your doctor will call you. The timing of your next colonoscopy is determined by your family history of polyps/colon cancer and what findings were on your colonoscopy. [facility] will keep track of when you are due for your next colonoscopy and contact you.     2. No driving or operating heavy power equipment today.    3. Do not drink alcohol for 12 hours after the procedure.     4. Do not sign any important or legally binding papers today.    5. No strenuous activity today (its a great day for a nap and to lay on the couch and watch some TV!).     6. You can eat whatever you like after your procedure.     7. You may experience drowsiness or forgetfulness at first. You may also notice altered  bowel habits, excessive gas and belching or bloated feeling.    8. If recommended by your physician, you should avoid Aspirin, Aspirin products and   Arthritis medications for 7 - 10 days following the exam if biopsies or polypectomies   have been performed.    9. You may develop soreness or redness where your IV medication was given.    Apply warm wet cloths to the area for 15 minutes 3 - 4 times per day.    10. You may have a bloated, gaseous feeling in your abdomen after a  Colonoscopy for the next few hours. Passing gas and belching will help. Walking or lying down on your left side with your knees flexed may relieve the discomfort.    11. Call the office at [phone] right away if you notice any of the following:  a. Vomiting of  blood and/or  coffee ground  material.  b. Rectal bleeding - >1Tbsp, blood clots, or continuous bleeding.  c. Severe abdominal pain.  d. Chills, fever over 101 degrees F.  e. Persistent nausea or vomiting.  f. Persistent disorientation/confusion.  g. Persistent coughing or spitting up blood.  h. Persistent redness, hardness, or tenderness at IV site.

## 2023-08-26 ENCOUNTER — HEALTH MAINTENANCE LETTER (OUTPATIENT)
Age: 63
End: 2023-08-26

## 2024-01-13 ENCOUNTER — HEALTH MAINTENANCE LETTER (OUTPATIENT)
Age: 64
End: 2024-01-13

## 2024-04-26 ENCOUNTER — ANESTHESIA EVENT (OUTPATIENT)
Dept: SURGERY | Facility: AMBULATORY SURGERY CENTER | Age: 64
End: 2024-04-26
Payer: COMMERCIAL

## 2024-04-29 ENCOUNTER — HOSPITAL ENCOUNTER (OUTPATIENT)
Facility: AMBULATORY SURGERY CENTER | Age: 64
Discharge: HOME | End: 2024-04-29
Attending: COLON & RECTAL SURGERY
Payer: COMMERCIAL

## 2024-04-29 ENCOUNTER — ANESTHESIA (OUTPATIENT)
Dept: SURGERY | Facility: AMBULATORY SURGERY CENTER | Age: 64
End: 2024-04-29
Payer: COMMERCIAL

## 2024-04-29 VITALS
SYSTOLIC BLOOD PRESSURE: 152 MMHG | TEMPERATURE: 97.6 F | HEART RATE: 96 BPM | OXYGEN SATURATION: 98 % | RESPIRATION RATE: 14 BRPM | DIASTOLIC BLOOD PRESSURE: 85 MMHG

## 2024-04-29 DIAGNOSIS — K63.5 POLYP, COLONIC: ICD-10-CM

## 2024-04-29 LAB
COLONOSCOPY: NORMAL
GLUCOSE POCT: 94 MG/DL (ref 70–99)

## 2024-04-29 RX ORDER — SODIUM CHLORIDE, SODIUM LACTATE, POTASSIUM CHLORIDE, CALCIUM CHLORIDE 600; 310; 30; 20 MG/100ML; MG/100ML; MG/100ML; MG/100ML
INJECTION, SOLUTION INTRAVENOUS CONTINUOUS
Status: DISCONTINUED | OUTPATIENT
Start: 2024-04-29 | End: 2024-04-30 | Stop reason: HOSPADM

## 2024-04-29 RX ORDER — PROPOFOL 10 MG/ML
INJECTION, EMULSION INTRAVENOUS PRN
Status: DISCONTINUED | OUTPATIENT
Start: 2024-04-29 | End: 2024-04-29

## 2024-04-29 RX ORDER — NALOXONE HYDROCHLORIDE 0.4 MG/ML
0.1 INJECTION, SOLUTION INTRAMUSCULAR; INTRAVENOUS; SUBCUTANEOUS
Status: DISCONTINUED | OUTPATIENT
Start: 2024-04-29 | End: 2024-04-30 | Stop reason: HOSPADM

## 2024-04-29 RX ORDER — LIDOCAINE HYDROCHLORIDE 20 MG/ML
INJECTION, SOLUTION INFILTRATION; PERINEURAL PRN
Status: DISCONTINUED | OUTPATIENT
Start: 2024-04-29 | End: 2024-04-29

## 2024-04-29 RX ORDER — ONDANSETRON 2 MG/ML
4 INJECTION INTRAMUSCULAR; INTRAVENOUS EVERY 30 MIN PRN
Status: DISCONTINUED | OUTPATIENT
Start: 2024-04-29 | End: 2024-04-30 | Stop reason: HOSPADM

## 2024-04-29 RX ORDER — GLYCOPYRROLATE 0.2 MG/ML
INJECTION, SOLUTION INTRAMUSCULAR; INTRAVENOUS PRN
Status: DISCONTINUED | OUTPATIENT
Start: 2024-04-29 | End: 2024-04-29

## 2024-04-29 RX ORDER — ONDANSETRON 2 MG/ML
INJECTION INTRAMUSCULAR; INTRAVENOUS PRN
Status: DISCONTINUED | OUTPATIENT
Start: 2024-04-29 | End: 2024-04-29

## 2024-04-29 RX ORDER — FENTANYL CITRATE 0.05 MG/ML
25 INJECTION, SOLUTION INTRAMUSCULAR; INTRAVENOUS
Status: DISCONTINUED | OUTPATIENT
Start: 2024-04-29 | End: 2024-04-30 | Stop reason: HOSPADM

## 2024-04-29 RX ORDER — PROPOFOL 10 MG/ML
INJECTION, EMULSION INTRAVENOUS CONTINUOUS PRN
Status: DISCONTINUED | OUTPATIENT
Start: 2024-04-29 | End: 2024-04-29

## 2024-04-29 RX ORDER — ONDANSETRON 4 MG/1
4 TABLET, ORALLY DISINTEGRATING ORAL EVERY 30 MIN PRN
Status: DISCONTINUED | OUTPATIENT
Start: 2024-04-29 | End: 2024-04-30 | Stop reason: HOSPADM

## 2024-04-29 RX ORDER — ACETAMINOPHEN 325 MG/1
975 TABLET ORAL ONCE
Status: DISCONTINUED | OUTPATIENT
Start: 2024-04-29 | End: 2024-04-30 | Stop reason: HOSPADM

## 2024-04-29 RX ORDER — LIDOCAINE 40 MG/G
CREAM TOPICAL
Status: DISCONTINUED | OUTPATIENT
Start: 2024-04-29 | End: 2024-04-30 | Stop reason: HOSPADM

## 2024-04-29 RX ORDER — ONDANSETRON 4 MG/1
4 TABLET, ORALLY DISINTEGRATING ORAL
Status: DISCONTINUED | OUTPATIENT
Start: 2024-04-29 | End: 2024-04-30 | Stop reason: HOSPADM

## 2024-04-29 RX ADMIN — PROPOFOL 160 MCG/KG/MIN: 10 INJECTION, EMULSION INTRAVENOUS at 14:32

## 2024-04-29 RX ADMIN — LIDOCAINE HYDROCHLORIDE 3 ML: 20 INJECTION, SOLUTION INFILTRATION; PERINEURAL at 14:32

## 2024-04-29 RX ADMIN — GLYCOPYRROLATE 0.2 MG: 0.2 INJECTION, SOLUTION INTRAMUSCULAR; INTRAVENOUS at 14:34

## 2024-04-29 RX ADMIN — ONDANSETRON 4 MG: 2 INJECTION INTRAMUSCULAR; INTRAVENOUS at 14:34

## 2024-04-29 RX ADMIN — SODIUM CHLORIDE, SODIUM LACTATE, POTASSIUM CHLORIDE, CALCIUM CHLORIDE: 600; 310; 30; 20 INJECTION, SOLUTION INTRAVENOUS at 12:52

## 2024-04-29 RX ADMIN — PROPOFOL 30 MG: 10 INJECTION, EMULSION INTRAVENOUS at 14:33

## 2024-04-29 ASSESSMENT — LIFESTYLE VARIABLES: TOBACCO_USE: 1

## 2024-04-29 NOTE — ANESTHESIA CARE TRANSFER NOTE
Patient: Jose Armando Brar    Procedure: Procedure(s):  COLONOSCOPY, SNARE POLYPECTOMY       Diagnosis: Polyp, colonic [K63.5]  Diagnosis Additional Information: No value filed.    Anesthesia Type:   MAC     Note:    Oropharynx: oropharynx clear of all foreign objects  Level of Consciousness: drowsy  Oxygen Supplementation: face mask  Level of Supplemental Oxygen (L/min / FiO2): 6  Independent Airway: airway patency satisfactory and stable  Dentition: dentition unchanged  Vital Signs Stable: post-procedure vital signs reviewed and stable  Report to RN Given: handoff report given  Patient transferred to: Phase II    Handoff Report: Identifed the Patient, Identified the Reponsible Provider, Reviewed the pertinent medical history, Discussed the surgical course, Reviewed Intra-OP anesthesia mangement and issues during anesthesia, Set expectations for post-procedure period and Allowed opportunity for questions and acknowledgement of understanding      Vitals:  Vitals Value Taken Time   /93 04/29/24 1529   Temp 97.6  F (36.4  C) 04/29/24 1529   Pulse 103    Resp 14 04/29/24 1529   SpO2 99 % 04/29/24 1529       Electronically Signed By: MICAH Turcios CRNA  April 29, 2024  3:30 PM

## 2024-04-29 NOTE — ANESTHESIA PREPROCEDURE EVALUATION
Anesthesia Pre-Procedure Evaluation    Patient: Jose Armando Brar   MRN: [id number] : 1960        Procedure : Procedure(s):  COLONOSCOPY          Past Medical History:   Diagnosis Date    Squamous cell carcinoma of skin, unspecified     Type 2 diabetes mellitus (H)       Past Surgical History:   Procedure Laterality Date    COLONOSCOPY N/A 2023    Procedure: COLONOSCOPY WITH POLYPECTOMY;  Surgeon: Elbert Wong MD;  Location: [facility]    HERNIA REPAIR      IR NEPHROLITHOTOMY  2020    IR NEPHROLITHOTOMY  2020    KNEE SURGERY      NEPHROLITHOTOMY Right 2020    Procedure: RIGHT PERCUTANEOUS NEPHROLITHOTOMY;  Surgeon: Vinicius Saenz MD;  Location: [facility];  Service: Urology    OTHER SURGICAL HISTORY      sleep apnea surgery    SHOULDER SURGERY      x3    ZZC OPEN RX SHLDR DISLOC,GR TUB FX  07    LT      No Known Allergies   Social History     Tobacco Use    Smoking status: Former     Types: Cigarettes    Smokeless tobacco: Never   Substance Use Topics    Alcohol use: Not Currently      Wt Readings from Last 1 Encounters:   23 81.6 kg (180 lb)        Anesthesia Evaluation   Pt has had prior anesthetic.     No history of anesthetic complications       ROS/MED HX  ENT/Pulmonary:     (+)                tobacco use, Past use,                       Neurologic:  - neg neurologic ROS     Cardiovascular:       METS/Exercise Tolerance: >4 METS    Hematologic:  - neg hematologic  ROS     Musculoskeletal:  - neg musculoskeletal ROS     GI/Hepatic:  - neg GI/hepatic ROS     Renal/Genitourinary:     (+)       Nephrolithiasis ,       Endo:     (+)  type II DM,   Not using insulin,          Obesity,       Psychiatric/Substance Use:  - neg psychiatric ROS     Infectious Disease:  - neg infectious disease ROS     Malignancy:       Other:            Physical Exam    Airway        Mallampati: III   TM distance: > 3 FB   Neck ROM: full     Respiratory Devices and Support    "      Dental       (+) Modest Abnormalities - crowns, retainers, 1 or 2 missing teeth      Cardiovascular          Rhythm and rate: regular     Pulmonary           breath sounds clear to auscultation           OUTSIDE LABS:  CBC:   Lab Results   Component Value Date    WBC 11.9 (H) 11/30/2007    HGB 14.2 01/24/2020    HGB 14.7 01/23/2020    HCT 44.2 11/30/2007     11/30/2007     BMP:   Lab Results   Component Value Date     01/24/2020     11/30/2007    POTASSIUM 4.6 01/24/2020    POTASSIUM 4.1 11/30/2007    CHLORIDE 103 01/24/2020    CHLORIDE 105 11/30/2007    CO2 30 01/24/2020    CO2 25 11/30/2007    BUN 14 01/24/2020    BUN 16 11/30/2007    CR 0.95 01/24/2020    CR 0.89 11/30/2007     (A) 05/24/2023     (A) 05/24/2023     COAGS: No results found for: \"PTT\", \"INR\", \"FIBR\"  POC: No results found for: \"BGM\", \"HCG\", \"HCGS\"  HEPATIC: No results found for: \"ALBUMIN\", \"PROTTOTAL\", \"ALT\", \"AST\", \"GGT\", \"ALKPHOS\", \"BILITOTAL\", \"BILIDIRECT\", \"REBA\"  OTHER:   Lab Results   Component Value Date    NICK 8.6 01/24/2020       Anesthesia Plan    ASA Status:  2    NPO Status:  NPO Appropriate    Anesthesia Type: MAC.   Induction: N/a.   Maintenance: TIVA.        Consents    Anesthesia Plan(s) and associated risks, benefits, and realistic alternatives discussed. Questions answered and patient/representative(s) expressed understanding.     - Discussed:     - Discussed with:  Patient            Postoperative Care       PONV prophylaxis: Ondansetron (or other 5HT-3), Dexamethasone or Solumedrol     Comments:               Montse Agustin MD                    "

## 2024-04-29 NOTE — ANESTHESIA POSTPROCEDURE EVALUATION
Patient: Jose Armando Brar    Procedure: Procedure(s):  COLONOSCOPY, SNARE POLYPECTOMY       Anesthesia Type:  MAC    Note:  Disposition: Outpatient   Postop Pain Control: Uneventful            Sign Out: Well controlled pain   PONV: No   Neuro/Psych: Uneventful            Sign Out: Acceptable/Baseline neuro status   Airway/Respiratory: Uneventful            Sign Out: Acceptable/Baseline resp. status   CV/Hemodynamics: Uneventful            Sign Out: Acceptable CV status; No obvious hypovolemia; No obvious fluid overload   Other NRE: NONE   DID A NON-ROUTINE EVENT OCCUR? No           Last vitals:  Vitals Value Taken Time   /82 04/29/24 1530   Temp 97.6  F (36.4  C) 04/29/24 1529   Pulse 114 04/29/24 1539   Resp 14 04/29/24 1529   SpO2 97 % 04/29/24 1539   Vitals shown include unfiled device data.    Electronically Signed By: Montse Agustin MD  April 29, 2024  3:42 PM

## 2024-04-29 NOTE — H&P
[facility]   History and Physical       History of Present Illness: 63 year old male w/ hx of numerous colon polyps status post prior endoscopic resection here for surveillance colonoscopy.    Past Medical History:   Diagnosis Date    Squamous cell carcinoma of skin, unspecified     Type 2 diabetes mellitus (H)        No Known Allergies    Past Surgical History:   Procedure Laterality Date    COLONOSCOPY N/A 5/24/2023    Procedure: COLONOSCOPY WITH POLYPECTOMY;  Surgeon: Elbert Wong MD;  Location: [facility]    HERNIA REPAIR      IR NEPHROLITHOTOMY  1/23/2020    IR NEPHROLITHOTOMY  1/23/2020    KNEE SURGERY      NEPHROLITHOTOMY Right 1/23/2020    Procedure: RIGHT PERCUTANEOUS NEPHROLITHOTOMY;  Surgeon: Vinicius Saenz MD;  Location: [facility];  Service: Urology    OTHER SURGICAL HISTORY      sleep apnea surgery    SHOULDER SURGERY      x3    ZZC OPEN RX SHLDR DISLOC,GR TUB FX  11/30/07    LT       History reviewed. No pertinent family history.    Social History     Socioeconomic History    Marital status:      Spouse name: Not on file    Number of children: Not on file    Years of education: Not on file    Highest education level: Not on file   Occupational History    Not on file   Tobacco Use    Smoking status: Former     Types: Cigarettes    Smokeless tobacco: Never   Substance and Sexual Activity    Alcohol use: Not Currently    Drug use: Never    Sexual activity: Not on file   Other Topics Concern    Not on file   Social History Narrative    Not on file     Social Determinants of Health     Financial Resource Strain: Not on File (9/17/2023)    Received from [employer]     Financial Resource Strain     Financial Resource Strain: 0   Food Insecurity: Not on File (9/17/2023)    Received from [employer]     Food Insecurity     Food: 0   Transportation Needs: Not on File (9/17/2023)    Received from [employer]     Transportation Needs     Transportation: 0   Physical  Activity: Not on File (2023)    Received from [employer]     Physical Activity     Physical Activity: 0   Stress: Not on File (2023)    Received from [employer]     Stress     Stress: 0   Social Connections: Not on File (2023)    Received from [employer]     Social Connections     Social Connections and Isolation: 0   Interpersonal Safety: Not on file   Housing Stability: Not on File (2023)    Received from [employer]     Housing Stability     Housin       Current Outpatient Medications   Medication Sig Dispense Refill    glipiZIDE (GLUCOTROL XL) 5 MG 24 hr tablet       JANUVIA 100 MG tablet       pioglitazone (ACTOS) 45 MG tablet       simvastatin (ZOCOR) 40 MG tablet       TRULICITY 0.75 MG/0.5ML pen        Current Facility-Administered Medications   Medication Dose Route Frequency Provider Last Rate Last Admin    acetaminophen (TYLENOL) tablet 975 mg  975 mg Oral Once Ammon Rodriguez MD        lactated ringers infusion   Intravenous Continuous Ammon Rodriguez  mL/hr at 24 1252 New Bag at 24 1252    lidocaine (LMX4) kit   Topical Q1H PRN Ammon Rodriguez MD        lidocaine 1 % 0.1-1 mL  0.1-1 mL Other Q1H PRN Ammon Rodriguez MD        sodium chloride (PF) 0.9% PF flush 3 mL  3 mL Intracatheter Q8H Ammon Rodriguez MD        sodium chloride (PF) 0.9% PF flush 3 mL  3 mL Intracatheter q1 min prn Ammon Rodriguez MD           Review of Systems:   A full 10 point review of systems was taken and is negative aside from what is noted above in the HPI.    Consitutional / General: Denies wt changes, fevers, chills or sweats.  Skin: Denies rashes, bruising, pruritis, or bleeding tendencies.   ENT: Denies trauma, headache, hearing loss, tinnitus, dysphagia  Eyes: Denies double vision  Respiratory: Denies SOB, cough, sputum production or dyspnea on exertion.   Cardiovascular: Denies chest pain, palpitations, orthostatic hypotension  Hematology / Lymph: Denies hx of blood clots or  pulmonary embolism  Gastrointestinal:  Denies loss of appetite, dysphagia, N/V, constipation or diarrhea.   Genitourinary: Denies dysuria, frequency, urgency, hesitancy, incontinence, nocturia, hematuria, difficulty starting or stopping their stream, hx of stones or hx of uti's.   Neurologic: Denies headache, LOC, memory loss, vertigo, syncope or seizures.   Musculoskeletal: Denies back pain, numbness, tingling or hx of back surgery  Psychological: alert and oriented    Intake/Output past 24 hrs:  No intake or output data in the 24 hours ending 04/29/24 1428    Physical Exam:  Temp:  [97  F (36.1  C)] 97  F (36.1  C)  Resp:  [14] 14  BP: (136)/(84) 136/84  SpO2:  [97 %] 97 %  General: NAD, alert, cooperative  Head: normocephalic, without abnormality / atraumatic  Respiratory: non-labored breathing, CTA-B  Cardiac: RRR +S1/S2  Abdomen: soft, non-tender, non-distended.  No guarding or rebound   Perianal/Rectal: deferred   Skin: no rashes or lesions  Musculoskeletal: moves all four extremities equally; no calf edema or tenderness  Psychological: alert and oriented, answers questions appropriately  Neurological: cranial nerves grossly intact    CBC RESULTS:   Recent Labs   Lab Test 01/24/20  0800   HGB 14.2       Last Comprehensive Metabolic Panel:  Sodium   Date Value Ref Range Status   01/24/2020 138 136 - 145 mmol/L Final   11/30/2007 137 133 - 144 mmol/L Final     Potassium   Date Value Ref Range Status   01/24/2020 4.6 3.5 - 5.0 mmol/L Final   11/30/2007 4.1 3.4 - 5.3 mmol/L Final     Chloride   Date Value Ref Range Status   01/24/2020 103 98 - 107 mmol/L Final   11/30/2007 105 94 - 109 mmol/L Final     Carbon Dioxide   Date Value Ref Range Status   11/30/2007 25 20 - 32 mmol/L Final     Carbon Dioxide (CO2)   Date Value Ref Range Status   01/24/2020 30 22 - 31 mmol/L Final     Anion Gap   Date Value Ref Range Status   01/24/2020 5 5 - 18 mmol/L Final   11/30/2007 7 6 - 17 mmol/L Final     Glucose   Date Value Ref  Range Status   05/24/2023 113 (A) 70 - 99 mg/dL Final     Comment:     Lot [id number] Exp 2023-12-03     GLUCOSE BY METER POCT   Date Value Ref Range Status   05/24/2023 167 (A) 70 - 99 mg/dL Final     Comment:     lot: [id number] exp: 12/3/23     Urea Nitrogen   Date Value Ref Range Status   01/24/2020 14 8 - 22 mg/dL Final   11/30/2007 16 5 - 24 mg/dL Final     Creatinine   Date Value Ref Range Status   01/24/2020 0.95 0.70 - 1.30 mg/dL Final   11/30/2007 0.89 0.80 - 1.50 mg/dL Final     GFR Estimate   Date Value Ref Range Status   01/24/2020 >60 >60 mL/min/1.73m2 Final   11/30/2007 >90 >60 mL/min/1.7m2 Final     Calcium   Date Value Ref Range Status   01/24/2020 8.6 8.5 - 10.5 mg/dL Final   11/30/2007 8.2 (L) 8.5 - 10.4 mg/dL Final       Assessment: Jose Armando Brar is a 63 year old male presenting for surveillance colonoscopy.     Plan: Colonoscopy under ROME Wong MD, ELADIO  [facility]  Office: [phone]  4/29/2024 2:28 PM

## 2024-04-29 NOTE — DISCHARGE INSTRUCTIONS
Post-Colonoscopy Discharge Instructions:    1. You have just had an examination of your colon (large intestine) called a colonoscopy. You should have a full report of the findings to take with you today. It will list if any polyps were removed or if any biopsies were taken to send to the laboratory. If we did take out polyps or do biopsies, you should get a letter in the next 1-2 weeks if they are the standard pre-cancerous polyps. If it is anything more serious, your doctor will call you. The timing of your next colonoscopy is determined by your family history of polyps/colon cancer and what findings were on your colonoscopy. [facility] will keep track of when you are due for your next colonoscopy and contact you.     2. No driving or operating heavy power equipment today.    3. Do not drink alcohol for 12 hours after the procedure.     4. Do not sign any important or legally binding papers today.    5. No strenuous activity today (its a great day for a nap and to lay on the couch and watch some TV!).     6. You can eat whatever you like after your procedure.     7. You may experience drowsiness or forgetfulness at first. You may also notice altered  bowel habits, excessive gas and belching or bloated feeling.    8. If recommended by your physician, you should avoid Aspirin, Aspirin products and   Arthritis medications for 7 - 10 days following the exam if biopsies or polypectomies   have been performed.    9. You may develop soreness or redness where your IV medication was given.    Apply warm wet cloths to the area for 15 minutes 3 - 4 times per day.    10. You may have a bloated, gaseous feeling in your abdomen after a  Colonoscopy for the next few hours. Passing gas and belching will help. Walking or lying down on your left side with your knees flexed may relieve the discomfort.    11. Call the office at [phone] right away if you notice any of the following:  a. Vomiting of blood  and/or  coffee ground  material.  b. Rectal bleeding - >1Tbsp, blood clots, or continuous bleeding.  c. Severe abdominal pain.  d. Chills, fever over 101 degrees F.  e. Persistent nausea or vomiting.  f. Persistent disorientation/confusion.  g. Persistent coughing or spitting up blood.  h. Persistent redness, hardness, or tenderness at IV site.      Same-Day Surgery   Adult Discharge Orders & Instructions     For 24 hours after surgery    Get plenty of rest.  A responsible adult must stay with you for at least 24 hours after you leave the hospital.     Do not drive or use heavy equipment.  If you have weakness or tingling, don't drive or use heavy equipment until this feeling goes away.    Do not drink alcohol.    Avoid strenuous or risky activities.  Ask for help when climbing stairs.     You may feel lightheaded.  If so, sit for a few minutes before standing.  Have someone help you get up.     You may have a slight fever. Call the doctor if your fever is over 100 F (37.7 C) (taken under the tongue) or lasts longer than 24 hours.    You may have a dry mouth, a sore throat, muscle aches or trouble sleeping.  These should go away after 24 hours.    Do not make important or legal decisions.    Based on the surgery/procedure that you had today, we do not expect that you will have any problems.  However, we want you to know what to do if you have pain, nausea, bleeding, or infection:    To control pain:  Take medicines your physician has prescribed or or over-the counter medicine he or she advises.  Ice packs and periods of rest are often helpful.  For surgery on an arm or leg, raise it on a pillow to ease swelling.  If your pain is not managed with the above methods, contact your physician.    Copyright [employer], Licensed under [employer]    To control nausea:  Take anti-nausea medicine approved by your physician.  Drink clear liquids such as apple juice, ginger ale, broth or 7-Up. Be sure to drink  enough fluids.  Move to a regular diet as you feel able.  Rest may also help.    Bleeding:  You may see a little blood on your dressing, about the size of a quarter in the first 24 hours.  If you see this, there is no reason to be alarmed.  However, if this continues to increase in size, apply pressure if able, and notify your physician.    Copyright [employer], Licensed under [employer]    Infection: Please contact your physician if you have any of the following signs:  redness, swelling, heat, increasing pain or foul-smelling drainage at your surgery site, fever or chills.    Call your doctor for any of the followin.  It has been over 8 to 10 hours since surgery and you are still not able to urinate (pass water).    2.  Headache for over 24 hours.

## 2024-06-01 ENCOUNTER — HEALTH MAINTENANCE LETTER (OUTPATIENT)
Age: 64
End: 2024-06-01

## 2024-10-19 ENCOUNTER — HEALTH MAINTENANCE LETTER (OUTPATIENT)
Age: 64
End: 2024-10-19

## 2025-01-25 ENCOUNTER — HEALTH MAINTENANCE LETTER (OUTPATIENT)
Age: 65
End: 2025-01-25